# Patient Record
Sex: MALE | Race: WHITE | NOT HISPANIC OR LATINO | Employment: FULL TIME | URBAN - METROPOLITAN AREA
[De-identification: names, ages, dates, MRNs, and addresses within clinical notes are randomized per-mention and may not be internally consistent; named-entity substitution may affect disease eponyms.]

---

## 2021-07-07 ENCOUNTER — HOSPITAL ENCOUNTER (OUTPATIENT)
Facility: MEDICAL CENTER | Age: 36
End: 2021-07-07
Payer: COMMERCIAL

## 2021-07-07 LAB — COVID ORDER STATUS COVID19: NORMAL

## 2021-07-08 LAB
SARS-COV-2 RNA RESP QL NAA+PROBE: NOTDETECTED
SPECIMEN SOURCE: NORMAL

## 2023-09-18 ENCOUNTER — APPOINTMENT (OUTPATIENT)
Dept: RADIOLOGY | Facility: MEDICAL CENTER | Age: 38
DRG: 099 | End: 2023-09-18
Attending: EMERGENCY MEDICINE

## 2023-09-18 ENCOUNTER — OFFICE VISIT (OUTPATIENT)
Dept: URGENT CARE | Facility: CLINIC | Age: 38
End: 2023-09-18

## 2023-09-18 ENCOUNTER — HOSPITAL ENCOUNTER (INPATIENT)
Facility: MEDICAL CENTER | Age: 38
LOS: 7 days | DRG: 099 | End: 2023-09-25
Attending: EMERGENCY MEDICINE | Admitting: INTERNAL MEDICINE

## 2023-09-18 ENCOUNTER — HOSPITAL ENCOUNTER (EMERGENCY)
Facility: MEDICAL CENTER | Age: 38
End: 2023-09-18

## 2023-09-18 VITALS
SYSTOLIC BLOOD PRESSURE: 102 MMHG | DIASTOLIC BLOOD PRESSURE: 62 MMHG | TEMPERATURE: 98.7 F | WEIGHT: 132.5 LBS | BODY MASS INDEX: 22.08 KG/M2 | RESPIRATION RATE: 14 BRPM | HEART RATE: 85 BPM | HEIGHT: 65 IN | OXYGEN SATURATION: 98 %

## 2023-09-18 DIAGNOSIS — R10.84 GENERALIZED ABDOMINAL PAIN: ICD-10-CM

## 2023-09-18 DIAGNOSIS — R20.2 PARESTHESIA: ICD-10-CM

## 2023-09-18 DIAGNOSIS — R29.898 WEAKNESS OF BOTH LOWER EXTREMITIES: ICD-10-CM

## 2023-09-18 DIAGNOSIS — R50.9 FEVER, UNSPECIFIED FEVER CAUSE: ICD-10-CM

## 2023-09-18 DIAGNOSIS — R27.0 ATAXIA: ICD-10-CM

## 2023-09-18 DIAGNOSIS — G37.3 TRANSVERSE MYELITIS (HCC): ICD-10-CM

## 2023-09-18 DIAGNOSIS — M54.2 NECK PAIN: ICD-10-CM

## 2023-09-18 DIAGNOSIS — R26.81 UNSTEADY GAIT WHEN WALKING: ICD-10-CM

## 2023-09-18 DIAGNOSIS — R20.2 NUMBNESS AND TINGLING OF BOTH LOWER EXTREMITIES: ICD-10-CM

## 2023-09-18 DIAGNOSIS — R20.0 NUMBNESS AND TINGLING OF BOTH LOWER EXTREMITIES: ICD-10-CM

## 2023-09-18 LAB
ALBUMIN SERPL BCP-MCNC: 4.3 G/DL (ref 3.2–4.9)
ALBUMIN/GLOB SERPL: 1.3 G/DL
ALP SERPL-CCNC: 85 U/L (ref 30–99)
ALT SERPL-CCNC: 22 U/L (ref 2–50)
ANION GAP SERPL CALC-SCNC: 11 MMOL/L (ref 7–16)
APPEARANCE UR: CLEAR
AST SERPL-CCNC: 19 U/L (ref 12–45)
BASOPHILS # BLD AUTO: 0.5 % (ref 0–1.8)
BASOPHILS # BLD: 0.04 K/UL (ref 0–0.12)
BILIRUB SERPL-MCNC: 0.8 MG/DL (ref 0.1–1.5)
BILIRUB UR QL STRIP.AUTO: NEGATIVE
BUN SERPL-MCNC: 9 MG/DL (ref 8–22)
CALCIUM ALBUM COR SERPL-MCNC: 9.3 MG/DL (ref 8.5–10.5)
CALCIUM SERPL-MCNC: 9.5 MG/DL (ref 8.5–10.5)
CHLORIDE SERPL-SCNC: 100 MMOL/L (ref 96–112)
CO2 SERPL-SCNC: 25 MMOL/L (ref 20–33)
COLOR UR: YELLOW
CREAT SERPL-MCNC: 1.02 MG/DL (ref 0.5–1.4)
CRP SERPL HS-MCNC: <0.3 MG/DL (ref 0–0.75)
EOSINOPHIL # BLD AUTO: 0.03 K/UL (ref 0–0.51)
EOSINOPHIL NFR BLD: 0.3 % (ref 0–6.9)
ERYTHROCYTE [DISTWIDTH] IN BLOOD BY AUTOMATED COUNT: 35.4 FL (ref 35.9–50)
ERYTHROCYTE [SEDIMENTATION RATE] IN BLOOD BY WESTERGREN METHOD: 2 MM/HOUR (ref 0–20)
FLUAV RNA SPEC QL NAA+PROBE: NEGATIVE
FLUBV RNA SPEC QL NAA+PROBE: NEGATIVE
GFR SERPLBLD CREATININE-BSD FMLA CKD-EPI: 96 ML/MIN/1.73 M 2
GLOBULIN SER CALC-MCNC: 3.4 G/DL (ref 1.9–3.5)
GLUCOSE SERPL-MCNC: 101 MG/DL (ref 65–99)
GLUCOSE UR STRIP.AUTO-MCNC: NEGATIVE MG/DL
HCT VFR BLD AUTO: 45.5 % (ref 42–52)
HGB BLD-MCNC: 15.5 G/DL (ref 14–18)
HIV 1+2 AB+HIV1 P24 AG SERPL QL IA: NORMAL
IMM GRANULOCYTES # BLD AUTO: 0.03 K/UL (ref 0–0.11)
IMM GRANULOCYTES NFR BLD AUTO: 0.3 % (ref 0–0.9)
KETONES UR STRIP.AUTO-MCNC: ABNORMAL MG/DL
LEUKOCYTE ESTERASE UR QL STRIP.AUTO: NEGATIVE
LIPASE SERPL-CCNC: 20 U/L (ref 11–82)
LYMPHOCYTES # BLD AUTO: 2.21 K/UL (ref 1–4.8)
LYMPHOCYTES NFR BLD: 25.3 % (ref 22–41)
MCH RBC QN AUTO: 28.9 PG (ref 27–33)
MCHC RBC AUTO-ENTMCNC: 34.1 G/DL (ref 32.3–36.5)
MCV RBC AUTO: 84.9 FL (ref 81.4–97.8)
MICRO URNS: ABNORMAL
MONOCYTES # BLD AUTO: 0.83 K/UL (ref 0–0.85)
MONOCYTES NFR BLD AUTO: 9.5 % (ref 0–13.4)
NEUTROPHILS # BLD AUTO: 5.58 K/UL (ref 1.82–7.42)
NEUTROPHILS NFR BLD: 64.1 % (ref 44–72)
NITRITE UR QL STRIP.AUTO: NEGATIVE
NRBC # BLD AUTO: 0 K/UL
NRBC BLD-RTO: 0 /100 WBC (ref 0–0.2)
PH UR STRIP.AUTO: 6.5 [PH] (ref 5–8)
PLATELET # BLD AUTO: 303 K/UL (ref 164–446)
PMV BLD AUTO: 8.3 FL (ref 9–12.9)
POTASSIUM SERPL-SCNC: 4.6 MMOL/L (ref 3.6–5.5)
PROCALCITONIN SERPL-MCNC: <0.05 NG/ML
PROT SERPL-MCNC: 7.7 G/DL (ref 6–8.2)
PROT UR QL STRIP: NEGATIVE MG/DL
RBC # BLD AUTO: 5.36 M/UL (ref 4.7–6.1)
RBC UR QL AUTO: NEGATIVE
RSV RNA SPEC QL NAA+PROBE: NEGATIVE
SARS-COV-2 RNA RESP QL NAA+PROBE: NOTDETECTED
SODIUM SERPL-SCNC: 136 MMOL/L (ref 135–145)
SP GR UR STRIP.AUTO: 1.01
SPECIMEN SOURCE: NORMAL
T PALLIDUM AB SER QL IA: NORMAL
TSH SERPL DL<=0.005 MIU/L-ACNC: 0.98 UIU/ML (ref 0.38–5.33)
UROBILINOGEN UR STRIP.AUTO-MCNC: 0.2 MG/DL
VIT B12 SERPL-MCNC: 860 PG/ML (ref 211–911)
WBC # BLD AUTO: 8.7 K/UL (ref 4.8–10.8)

## 2023-09-18 PROCEDURE — A9270 NON-COVERED ITEM OR SERVICE: HCPCS | Performed by: INTERNAL MEDICINE

## 2023-09-18 PROCEDURE — 3078F DIAST BP <80 MM HG: CPT | Performed by: STUDENT IN AN ORGANIZED HEALTH CARE EDUCATION/TRAINING PROGRAM

## 2023-09-18 PROCEDURE — 700102 HCHG RX REV CODE 250 W/ 637 OVERRIDE(OP): Performed by: INTERNAL MEDICINE

## 2023-09-18 PROCEDURE — 72157 MRI CHEST SPINE W/O & W/DYE: CPT

## 2023-09-18 PROCEDURE — 82607 VITAMIN B-12: CPT

## 2023-09-18 PROCEDURE — 85025 COMPLETE CBC W/AUTO DIFF WBC: CPT

## 2023-09-18 PROCEDURE — 84145 PROCALCITONIN (PCT): CPT

## 2023-09-18 PROCEDURE — 3074F SYST BP LT 130 MM HG: CPT | Performed by: STUDENT IN AN ORGANIZED HEALTH CARE EDUCATION/TRAINING PROGRAM

## 2023-09-18 PROCEDURE — 83690 ASSAY OF LIPASE: CPT

## 2023-09-18 PROCEDURE — 99254 IP/OBS CNSLTJ NEW/EST MOD 60: CPT | Mod: GC | Performed by: PSYCHIATRY & NEUROLOGY

## 2023-09-18 PROCEDURE — 700117 HCHG RX CONTRAST REV CODE 255: Performed by: EMERGENCY MEDICINE

## 2023-09-18 PROCEDURE — 81003 URINALYSIS AUTO W/O SCOPE: CPT

## 2023-09-18 PROCEDURE — 0241U HCHG SARS-COV-2 COVID-19 NFCT DS RESP RNA 4 TRGT MIC: CPT

## 2023-09-18 PROCEDURE — 99285 EMERGENCY DEPT VISIT HI MDM: CPT

## 2023-09-18 PROCEDURE — 99205 OFFICE O/P NEW HI 60 MIN: CPT | Performed by: STUDENT IN AN ORGANIZED HEALTH CARE EDUCATION/TRAINING PROGRAM

## 2023-09-18 PROCEDURE — 36415 COLL VENOUS BLD VENIPUNCTURE: CPT

## 2023-09-18 PROCEDURE — 86780 TREPONEMA PALLIDUM: CPT

## 2023-09-18 PROCEDURE — A9579 GAD-BASE MR CONTRAST NOS,1ML: HCPCS | Performed by: EMERGENCY MEDICINE

## 2023-09-18 PROCEDURE — 86140 C-REACTIVE PROTEIN: CPT

## 2023-09-18 PROCEDURE — 74177 CT ABD & PELVIS W/CONTRAST: CPT

## 2023-09-18 PROCEDURE — 99223 1ST HOSP IP/OBS HIGH 75: CPT | Performed by: INTERNAL MEDICINE

## 2023-09-18 PROCEDURE — 85652 RBC SED RATE AUTOMATED: CPT

## 2023-09-18 PROCEDURE — C9803 HOPD COVID-19 SPEC COLLECT: HCPCS | Performed by: EMERGENCY MEDICINE

## 2023-09-18 PROCEDURE — 84443 ASSAY THYROID STIM HORMONE: CPT

## 2023-09-18 PROCEDURE — 770020 HCHG ROOM/CARE - TELE (206)

## 2023-09-18 PROCEDURE — 72158 MRI LUMBAR SPINE W/O & W/DYE: CPT

## 2023-09-18 PROCEDURE — 87389 HIV-1 AG W/HIV-1&-2 AB AG IA: CPT

## 2023-09-18 PROCEDURE — 80053 COMPREHEN METABOLIC PANEL: CPT

## 2023-09-18 RX ORDER — ENOXAPARIN SODIUM 100 MG/ML
40 INJECTION SUBCUTANEOUS DAILY
Status: DISCONTINUED | OUTPATIENT
Start: 2023-09-18 | End: 2023-09-25 | Stop reason: HOSPADM

## 2023-09-18 RX ORDER — AMOXICILLIN 250 MG
2 CAPSULE ORAL 2 TIMES DAILY
Status: DISCONTINUED | OUTPATIENT
Start: 2023-09-18 | End: 2023-09-25 | Stop reason: HOSPADM

## 2023-09-18 RX ORDER — BISACODYL 10 MG
10 SUPPOSITORY, RECTAL RECTAL
Status: DISCONTINUED | OUTPATIENT
Start: 2023-09-18 | End: 2023-09-25 | Stop reason: HOSPADM

## 2023-09-18 RX ORDER — ACETAMINOPHEN 325 MG/1
650 TABLET ORAL EVERY 6 HOURS PRN
Status: DISCONTINUED | OUTPATIENT
Start: 2023-09-18 | End: 2023-09-25 | Stop reason: HOSPADM

## 2023-09-18 RX ORDER — POLYETHYLENE GLYCOL 3350 17 G/17G
1 POWDER, FOR SOLUTION ORAL
Status: DISCONTINUED | OUTPATIENT
Start: 2023-09-18 | End: 2023-09-25 | Stop reason: HOSPADM

## 2023-09-18 RX ADMIN — SENNOSIDES AND DOCUSATE SODIUM 2 TABLET: 50; 8.6 TABLET ORAL at 20:25

## 2023-09-18 RX ADMIN — GADOTERIDOL 15 ML: 279.3 INJECTION, SOLUTION INTRAVENOUS at 14:42

## 2023-09-18 RX ADMIN — IOHEXOL 100 ML: 350 INJECTION, SOLUTION INTRAVENOUS at 12:25

## 2023-09-18 ASSESSMENT — ENCOUNTER SYMPTOMS
TINGLING: 1
WEIGHT LOSS: 0
FOCAL WEAKNESS: 1
ORTHOPNEA: 0
SENSORY CHANGE: 1
ABDOMINAL PAIN: 0
HEADACHES: 0
FEVER: 1
WHEEZING: 0
NECK PAIN: 0
FALLS: 0
PALPITATIONS: 0
LOSS OF CONSCIOUSNESS: 0
BACK PAIN: 0
DIZZINESS: 0
BLURRED VISION: 0
HEARTBURN: 0
DOUBLE VISION: 0
CARDIOVASCULAR NEGATIVE: 1
SEIZURES: 0
PALPITATIONS: 0
SHORTNESS OF BREATH: 0
HEMOPTYSIS: 0
PHOTOPHOBIA: 0
CLAUDICATION: 0
NAUSEA: 0
GASTROINTESTINAL NEGATIVE: 1
COUGH: 0
MYALGIAS: 0
TREMORS: 0
BACK PAIN: 1
SPUTUM PRODUCTION: 0
FEVER: 0
WEAKNESS: 1
NECK PAIN: 1
VOMITING: 0
HEADACHES: 1
EYES NEGATIVE: 1
NECK PAIN: 1
FEVER: 1
DIARRHEA: 0
CHILLS: 0
RESPIRATORY NEGATIVE: 1
FOCAL WEAKNESS: 1
DIZZINESS: 1
SENSORY CHANGE: 1
TINGLING: 1
CONSTIPATION: 0
WEAKNESS: 1
SPEECH CHANGE: 0

## 2023-09-18 ASSESSMENT — FIBROSIS 4 INDEX: FIB4 SCORE: 0.51

## 2023-09-18 ASSESSMENT — PAIN DESCRIPTION - PAIN TYPE: TYPE: ACUTE PAIN

## 2023-09-18 ASSESSMENT — VISUAL ACUITY: VA_NORMAL: 1

## 2023-09-18 ASSESSMENT — LIFESTYLE VARIABLES: SUBSTANCE_ABUSE: 1

## 2023-09-18 NOTE — ED PROVIDER NOTES
"ER Provider Note    Scribed for Rohit Pereyra M.D. by Lowell Lee. 9/18/2023   11:09 AM    Primary Care Provider: No primary care provider on file.    CHIEF COMPLAINT  Chief Complaint   Patient presents with    Weakness     +fever, headache, reproducible abdominal pain, neck pain, numbness tingling BLE, weakness since Wednesday. Reports history of myelitis, 7 years ago. He feels like these symptoms are the same.      EXTERNAL RECORDS REVIEWED  Other none    HPI/ROS  LIMITATION TO HISTORY   Select: Language Faroese,  Used   OUTSIDE HISTORIAN(S):  Significant other at bedside.    Alexandrea Isabel is a 38 y.o. male who presents to the ED for evaluation of lower extremity weakness onset 3 ago. The patient states he also has fever (7 days ago), but denies any recent fever, runny nose, chest pain, or difficulty breathing. He describes his legs to have pins and needles and states sever abdomen pain. No alleviating or exacerbating factors were noted. He reports history of myelitis 7 years, treated with steroids and IV fluids over a month.    PAST MEDICAL HISTORY  Past Medical History:   Diagnosis Date    Myelitis (HCC)        SURGICAL HISTORY  History reviewed. No pertinent surgical history.    FAMILY HISTORY  History reviewed. No pertinent family history.    SOCIAL HISTORY   reports that he has been smoking cigarettes. He has never used smokeless tobacco. He reports that he does not currently use alcohol. He reports that he does not currently use drugs.    CURRENT MEDICATIONS  Previous Medications    No medications on file       ALLERGIES  No Known Allergies     PHYSICAL EXAM  /83   Pulse 63   Temp 36.2 °C (97.1 °F) (Temporal)   Resp 20   Ht 1.65 m (5' 4.96\")   Wt 60 kg (132 lb 4.4 oz)   SpO2 94%   BMI 22.04 kg/m²      Constitutional: Well developed, Well nourished, mild distress,    HENT: Normocephalic, Atraumatic   Eyes: PERRLA, EOMI, Conjunctiva normal, No discharge.   Neck: No tenderness, Supple, " No stridor.   Cardiovascular: Normal heart rate, Normal rhythm.   Thorax & Lungs: Clear to auscultation bilaterally, No respiratory distress, No wheezing, No crackles.   Abdomen: Soft, No tenderness, No masses, No pulsatile masses.   Skin: Warm, Dry, No erythema, No rash.    Musculoskeletal: No major deformities noted.  Intact distal pulses  Neurologic: Awake, alert. Moves all extremities spontaneously.   Psychiatric: Affect normal, Judgment normal, Mood normal. Subjective decreased sensation throughout lower extremities, but muscle strength 5/5.       DIAGNOSTIC STUDIES    Labs:   Results for orders placed or performed during the hospital encounter of 09/18/23   CBC WITH DIFFERENTIAL   Result Value Ref Range    WBC 8.7 4.8 - 10.8 K/uL    RBC 5.36 4.70 - 6.10 M/uL    Hemoglobin 15.5 14.0 - 18.0 g/dL    Hematocrit 45.5 42.0 - 52.0 %    MCV 84.9 81.4 - 97.8 fL    MCH 28.9 27.0 - 33.0 pg    MCHC 34.1 32.3 - 36.5 g/dL    RDW 35.4 (L) 35.9 - 50.0 fL    Platelet Count 303 164 - 446 K/uL    MPV 8.3 (L) 9.0 - 12.9 fL    Neutrophils-Polys 64.10 44.00 - 72.00 %    Lymphocytes 25.30 22.00 - 41.00 %    Monocytes 9.50 0.00 - 13.40 %    Eosinophils 0.30 0.00 - 6.90 %    Basophils 0.50 0.00 - 1.80 %    Immature Granulocytes 0.30 0.00 - 0.90 %    Nucleated RBC 0.00 0.00 - 0.20 /100 WBC    Neutrophils (Absolute) 5.58 1.82 - 7.42 K/uL    Lymphs (Absolute) 2.21 1.00 - 4.80 K/uL    Monos (Absolute) 0.83 0.00 - 0.85 K/uL    Eos (Absolute) 0.03 0.00 - 0.51 K/uL    Baso (Absolute) 0.04 0.00 - 0.12 K/uL    Immature Granulocytes (abs) 0.03 0.00 - 0.11 K/uL    NRBC (Absolute) 0.00 K/uL   COMP METABOLIC PANEL   Result Value Ref Range    Sodium 136 135 - 145 mmol/L    Potassium 4.6 3.6 - 5.5 mmol/L    Chloride 100 96 - 112 mmol/L    Co2 25 20 - 33 mmol/L    Anion Gap 11.0 7.0 - 16.0    Glucose 101 (H) 65 - 99 mg/dL    Bun 9 8 - 22 mg/dL    Creatinine 1.02 0.50 - 1.40 mg/dL    Calcium 9.5 8.5 - 10.5 mg/dL    Correct Calcium 9.3 8.5 - 10.5 mg/dL     AST(SGOT) 19 12 - 45 U/L    ALT(SGPT) 22 2 - 50 U/L    Alkaline Phosphatase 85 30 - 99 U/L    Total Bilirubin 0.8 0.1 - 1.5 mg/dL    Albumin 4.3 3.2 - 4.9 g/dL    Total Protein 7.7 6.0 - 8.2 g/dL    Globulin 3.4 1.9 - 3.5 g/dL    A-G Ratio 1.3 g/dL   LIPASE   Result Value Ref Range    Lipase 20 11 - 82 U/L   URINALYSIS    Specimen: Urine   Result Value Ref Range    Color Yellow     Character Clear     Specific Gravity 1.015 <1.035    Ph 6.5 5.0 - 8.0    Glucose Negative Negative mg/dL    Ketones Trace (A) Negative mg/dL    Protein Negative Negative mg/dL    Bilirubin Negative Negative    Urobilinogen, Urine 0.2 Negative    Nitrite Negative Negative    Leukocyte Esterase Negative Negative    Occult Blood Negative Negative    Micro Urine Req see below    ESTIMATED GFR   Result Value Ref Range    GFR (CKD-EPI) 96 >60 mL/min/1.73 m 2   CoV-2, Flu A/B, And RSV by PCR ([facility])    Specimen: Respirate   Result Value Ref Range    Influenza virus A RNA Negative Negative    Influenza virus B, PCR Negative Negative    RSV, PCR Negative Negative    SARS-CoV-2 by PCR NotDetected     SARS-CoV-2 Source NP Swab    Sed Rate   Result Value Ref Range    Sed Rate Westergren 2 0 - 20 mm/hour   CRP QUANTITIVE (NON-CARDIAC)   Result Value Ref Range    Stat C-Reactive Protein <0.30 0.00 - 0.75 mg/dL   PROCALCITONIN   Result Value Ref Range    Procalcitonin <0.05 <0.25 ng/mL     Radiology:   This attending emergency physician has independently interpreted the diagnostic imaging associated with this visit and is awaiting the final reading from the radiologist.   Preliminary interpretation is a follows: No intra-abdominal abnormality  Radiologist interpretation:   MR-THORACIC SPINE-WITH & W/O   Final Result         MRI of the thoracic spine without and with contrast within normal limits.      MR-LUMBAR SPINE-WITH & W/O   Final Result         Mild lumbar spine degenerative changes as described above without significant canal stenosis or  foraminal narrowing.      No abnormal enhancement is identified in the lumbar spine.      Annular fissure noted at the dorsal aspect of L3-4. This could represent an independent source of back pain.      CT-ABDOMEN-PELVIS WITH   Final Result      No acute abnormality of the abdomen or pelvis.             COURSE & MEDICAL DECISION MAKING     ED Observation Status? Yes; I am placing the patient in to an observation status due to a diagnostic uncertainty as well as therapeutic intensity. Patient placed in observation status at 11:20 AM, 9/18/2023.     Observation plan is as follows: serum studies and imaging    Upon Reevaluation, the patient's condition has: not improved; and will be escalated to hospitalization.    Patient discharged from ED Observation status at 3:33 PM (Time) 9/18/2023  (Date).     INITIAL ASSESSMENT, COURSE AND PLAN  Care Narrative: Patient is coming in with a fever approximately a week ago with slowly progressing paresthesias.  Patient is now having right lower extremity weakness.  States he has had myelitis previously approximately 7 years ago where the patient was admitted to the hospital.  Due to the patient's symptoms I have elected to get MRIs of the spine both thoracic and lumbar, these are negative, CRP is negative, procalcitonin is negative, electrolytes are unremarkable however when I attempt to ambulate the patient he is ataxic.  Due to that I will consult neurology admit the patient to the hospital.    11:09 AM - Patient was evaluated at bedside. Ordered for UA, lipase, CMP, cbc w/ diff, procalcitonin, CoV-2, Flu a/b, RSV, crp quant, sed rate, ct-abdomen-pelvis, mr-thoracic spine and lumbar spine to evaluate. Patient verbalizes understanding and support with my plan of care.     Differential diagnoses include but not limited to: transverse myelitis vs sepsis vs Covid     3:33 PM  MRIs have returned, they are unremarkable, discussed this with the patient and the family.  Attempted to  ambulate the patient and he is very ataxic and almost falls down.  Will consult neurology, will have the patient be admitted to the hospital.    DISPOSITION AND DISCUSSIONS    I have discussed management of the patient with the following physicians and ALLIE's: Neurology, hospitalist    Discussion of management with other [facility] or appropriate source(s): Radiologist Dr. Sood      Admit to the hospitalist in guarded condition    FINAL DIAGNOSIS  1. Ataxia    2. Paresthesia         Lowell COLE (Anna), am scribing for, and in the presence of, Rohit Pereyra M.D..    Electronically signed by: Lowell Lee ([email or web address]), 9/18/2023    IRohit M.D. personally performed the services described in this documentation, as scribed by Lowell Lee in my presence, and it is both accurate and complete.      The note accurately reflects work and decisions made by me.  Rohit Pereyra M.D.  9/18/2023  3:36 PM

## 2023-09-18 NOTE — ED TRIAGE NOTES
".  Chief Complaint   Patient presents with    Weakness     +fever, headache, reproducible abdominal pain, neck pain, numbness tingling BLE, weakness since Wednesday. Reports history of myelitis, 7 years ago. He feels like these symptoms are the same.        39 yo male ambulatory to triage for above complaint. He lives in [facility] [facility] and is here for work. Abdominal pain protocol ordered.      Pt is alert and oriented, follows commands and responds appropriately to questions.      Patient placed back for phlebotomy and educated on triage process. Asked to inform RN of any changes or concerns.     /85   Pulse 87   Temp 36.2 °C (97.1 °F) (Temporal)   Resp 18   Ht 1.65 m (5' 4.96\")   Wt 60 kg (132 lb 4.4 oz)   SpO2 97%   BMI 22.04 kg/m²     "

## 2023-09-18 NOTE — ED NOTES
Patient from [facility] to [facility] [facility] via wheelchair accompanied by ED RN and friend. Noted with marked BLE weakness upon ambulation attempt - knees buckling. Chart up for ERP.

## 2023-09-18 NOTE — CONSULTS
"Neurology Daily Progress Note    Date of Service  9/18/2023    Chief Complaint  38 y.o. male admitted 9/18/2023 with subjective fevers followed by paresthesias and weakness in the B/L UEs and LEs.     He is accompanied by his co-worker from [employer] who was kind enough to translate for us with patient's permission.     HPI:    Mr. Isabel is a 37yo male patient w/ pt reported history of \"myelitis\" likely transverse myelitis 7-8 years ago, who presented to the ED on 9/18/2023 with the above complaints.    He reportedly flew in from [place] recently, [place] on 9[place]8/2023, went to work on 9/11 which is when he started having some subjective fevers but no other respiratory/GI/ symptoms of infection. Fever resolved with some OTC meds. However, he then reports developing some weakness and paresthesias in the b/l lower extremities. These symptoms over the next two days started to ascend, to the waist. The sxs also worsened in nature, had numbness and tingling, increased sensitivity to touch, balance issues due to which he started having a wide based ataxic gait. He reports noticing these symptoms mainly in the lower extremities, worse on the left side.     He also reports some pain at the nape of the neck, started yesterday (09/17/2023). Pain is worse on movement, including on flexion of neck. He denies any neck stiffness but does report some limitation of ROM due to pain.     He also reports having a band like tightness around his trunk over the past few days, describes it as a \"Sarong Wrap\" around the upper abdomen.      He was reportedly diagnosed with Transverse myelitis (not sure but remembers someone mentioning \"myelitis\" and that it wasn't \"Guillain [place]\") about 7-8 years ago, had near identical presentation at that time, and was treated with IV medications (unclear if steroids vs IVIG) following which his symptoms had resolved.     He denies having any significant neurological history in his family.     No " loss of bowel or bladder incontinence. No saddle anesthesia.   No recent falls or trauma to the back/head. No new medications or vaccinations recently.   No known sick contacts recently, no difficulty with breathing/swallowing, no recent changes in vision or hearing.     Hospital course:    -MRI lumbar spine w/ and w/o contrast:   Mild lumbar degenerative changes w/o significant canal stenosis or foraminal narrowing. No abnormal enhancement is noted in the lumbar spine.   Annual fissure noted at the dorsal aspect of L3-4, which could represent an independent source of back pain.   (Radiology note also mentions: L3-L4 broad based disc bulge with right paracentral disc extrusion associated annular fissure, however there is only minimal canal narrowing, bilateral mild facet degeneration. L4-L5 Broad based disc bulge and bilateral facet degeneration with a superimposed central disc extrusion migrates caudally behind L5 causing mild canal narrowing, but w/o significant cauda equina compression.)    -MRI thoracic spine w/ and w/o contrast: MRI of the thoracic spine without and with contrast within normal limits.    -CT Abdomen and pelvis w/ contrast: No acute abnormality in the abdomen/pelvis.   -Blood work with CBC and CMP were largely unremarkable. Neg COVID, Flu, and RSV tests.     Code Status  Full Code    Review of Systems  Review of Systems   Constitutional:  Positive for fever (6 days ago, subjective fevers). Negative for chills, malaise/fatigue and weight loss.   HENT: Negative.  Negative for congestion, hearing loss and tinnitus.    Eyes: Negative.  Negative for blurred vision and double vision.   Respiratory: Negative.  Negative for cough, sputum production, shortness of breath and wheezing.    Cardiovascular: Negative.    Gastrointestinal: Negative.  Negative for abdominal pain, diarrhea, heartburn, nausea and vomiting.   Genitourinary: Negative.    Musculoskeletal:  Positive for neck pain (Reports neck pain at  the nape of the neck, worse with movement including neck flexion. No stiffness, but ROM limited due to pain). Negative for back pain, falls and joint pain.   Skin:  Negative for itching and rash.   Neurological:  Positive for tingling, sensory change, focal weakness and weakness. Negative for dizziness, tremors, speech change, seizures, loss of consciousness and headaches.   Endo/Heme/Allergies: Negative.    Psychiatric/Behavioral:  Positive for substance abuse (Smokes cigarettes).    All other systems reviewed and are negative.       Physical Exam  Temp:  [36.2 °C (97.1 °F)] 36.2 °C (97.1 °F)  Pulse:  [63-87] 63  Resp:  [13-20] 20  BP: (120-138)/(76-85) 125/83  SpO2:  [94 %-97 %] 94 %    Neurological Exam  Mental Status  Awake and alert. Oriented to person, place, time and situation. Recent and remote memory are intact. Speech is normal. Attention and concentration are normal. Fund of knowledge is appropriate for level of education.    Cranial Nerves  CN II: Visual acuity is normal. Visual fields full to confrontation.  CN III, IV, VI: Extraocular movements intact bilaterally. Normal lids and orbits bilaterally. Pupils equal round and reactive to light bilaterally.  CN V: Facial sensation is normal.  CN VII: Full and symmetric facial movement.  CN VIII: Hearing is normal.  CN IX, X: Palate elevates symmetrically. Normal gag reflex.  CN XI: Shoulder shrug strength is normal.  CN XII: Tongue midline without atrophy or fasciculations.    Motor  Normal muscle bulk throughout. Normal muscle tone. Strength is 5/5 in all four extremities except as noted. No pronator drift.                                             Right                     Left   Shoulder abduction               5                          5   Shoulder adduction               5                          5  Elbow flexion                         5                          5  Elbow extension                    5                          5  Wrist flexion                            5                          5  Wrist extension                      5                          5  Finger flexion                         5                          5  Finger extension                    5                          5  Finger abduction                    5                          5  Thumb flexion                        5                          5  Thumb extension                   5                          5  Thumb abduction                   5                          5  Hip flexion                              5                          4+  Hip extension                         5                          4+  Knee flexion                           5                          5  Knee extension                      5                          5  Plantarflexion                         5                          5  Dorsiflexion                            5                          5                                             Right                     Left   Quadriceps                                                    4  Strength: Strength 4/5 in the LLE thigh flexion and extension, otherwise 5/5 everywhere else. .    Sensory  Light touch is normal in upper and lower extremities. Temperature abnormality: See under sensory level. . Vibration abnormality: Vibration sense reduced at the toes bilaterally. Intact in the b/l upper extremities. .   Right: There is a sensory level at T5.  Left: There is a sensory level at T4. Sensation level: Had notable sensory deficits over the back, from T4-T5 level and below. .    Reflexes                                            Right                      Left  Brachioradialis                    3+                         3+  Biceps                                 3+                         3+  Triceps                                3+                         3+  Finger flex                           3+                         3+  Hamstring                             3+                         3+  Patellar                                3+                         3+  Right Plantar: equivocal  Left Plantar: equivocal  Deep tendon reflexes: Plantar reflex equivocal bilaterally, favorable to downgoing. .    Right pathological reflexes: Echo's present. Ankle clonus present. Sustained.  Left pathological reflexes: Echo's present. Ankle clonus present. Sustained.  Pathological reflexes: Wartenberg's Sign negative bilaterally. .    Coordination  Right: Finger-to-nose normal.Left: Finger-to-nose normal.  Abnormal coordination in the LLE.  On Toe to Finger testing, he had notable ataxia in the left lower extremity. .    Gait  Casual gait: Ataxic gait. Wide-based, cautious, slow gait. . Tandem gait abnormality:      Fluids  No intake or output data in the 24 hours ending 09/18/23 1623    Laboratory  Recent Labs     09/18/23  1015   WBC 8.7   RBC 5.36   HEMOGLOBIN 15.5   HEMATOCRIT 45.5   MCV 84.9   MCH 28.9   MCHC 34.1   RDW 35.4*   PLATELETCT 303   MPV 8.3*     Recent Labs     09/18/23  1015   SODIUM 136   POTASSIUM 4.6   CHLORIDE 100   CO2 25   GLUCOSE 101*   BUN 9   CREATININE 1.02   CALCIUM 9.5                   Imaging  MR-THORACIC SPINE-WITH & W/O   Final Result         MRI of the thoracic spine without and with contrast within normal limits.      MR-LUMBAR SPINE-WITH & W/O   Final Result         Mild lumbar spine degenerative changes as described above without significant canal stenosis or foraminal narrowing.      No abnormal enhancement is identified in the lumbar spine.      Annular fissure noted at the dorsal aspect of L3-4. This could represent an independent source of back pain.      CT-ABDOMEN-PELVIS WITH   Final Result      No acute abnormality of the abdomen or pelvis.           Assessment/Plan     Summary:    Mr. Isabel is a 39yo male patient w/ pt reported personal hx of transverse myelitis over 7-8 years ago, w/o any residual focal neurological  deficits at baseline, who presented to the ED on 9/18/2023 w/ subjective fevers 1 week ago followed by development of sxs like paresthesias, weakness, and ataxic gait. MRI of the thoracic and lumbar spine are negative for any extraaxial compressive etiology. Neurology consulted due to concerns for transverse myelitis.     Assessment:    -Pt presents with acute onset sensorimotor dysfunction that is attributable to the spinal cord, and presence of myelopathic sxs. No bowel/bladder incontinence or evidence of autonomic dysfunction at this time. Clinical presentation is certainly concerning for acute myelopathy, including Transverse Myelitis.   -There is a clearly defined sensory level around T4-T5 on exam and hx of band like tightness in the same region but interestingly his MRI of thoracic spine was neg for attributable lesions. He also has some focal motor weakness in the LLE (hips 4/5, 5/5 elsewhere).   -Given hx of fever and meningismus, it is important to rule an infectious etiology before treating with high dose steroids/IVIG.    Plan/Recommendations:    -Obtain MRI of the Brain and Cervical spine w/ and w/o contrast ASAP.   -Lumbar puncture after MRI of the brain and cervical spine are completed.   -Final recommendations for treatment after these evaluations are completed.       Thank you for the consult.     Dr. Jennifer Rosenbaum MD   [facility] IM PGY 2 Resident    Please refer to Dr. Gonzalez's attestation for additional comments/recommendations.

## 2023-09-18 NOTE — ED NOTES
ED Triage Whitley Iraheta states that urine sample was sent from [facility]. Called lab - state that they do not have a urine sample. Patient informed of need for recollection of urine specimen. Urinal at bedside.

## 2023-09-18 NOTE — ED NOTES
Name:  Ena   ID Number:  [id number]    Content Discussed:      Patient assessment and history, fall precautions, plan of care.

## 2023-09-18 NOTE — ED NOTES
Med rec is complete per Patient's friend at bedside.     Allergies reviewed.    Has patient had any outside antibiotics in the last 30 days? N    Any Anticoagulants (rivaroxaban, apixaban, edoxaban, dabigatran, warfarin, enoxaparin) taken in the last 14 days? N     Chemo or Outpatient Infusions? N       Preferred pharmacy for this visit : [facility] [phone]

## 2023-09-18 NOTE — PROGRESS NOTES
"Subjective     Maame Lechuga is a 38 y.o. male who presents with Fever (Last Monday went away on tuesday), Headache (Started yesterday), Tingling (Started in his abdomen,an down his legs. Now it feels like pins an needles. Very shakey when he walks.  ), and Neck Pain (Hurts when he turns his head/)            Maame is a 38 y.o. male who presents to urgent care with his coworker.  Patient states he developed a fever on Monday of last week.  Fever has since resolved.  Patient then developed symptoms of headache, neck pain and lower back pain.  Patient also experiencing symptoms of numbness and tingling which he describes as \"pins-and-needles\" sensation in bilateral lower legs and abdomen. Patient also has had unsteady gait and his coworker reports unsteady gait/weakness which he has need assistance to walk from co-workers. Patient does report prior history of myelitis and states symptoms are similar.  Patient also states he has not been eating/drinking as much this week secondary to not feeling well and weakness. No URI-like symptoms.        Review of Systems   Constitutional:  Positive for fever and malaise/fatigue.   Cardiovascular:  Negative for chest pain and palpitations.   Musculoskeletal:  Positive for back pain and neck pain.   Neurological:  Positive for tingling, sensory change, focal weakness, weakness and headaches.              Objective     /62   Pulse 85   Temp 37.1 °C (98.7 °F) (Temporal)   Resp 14   Ht 1.65 m (5' 4.96\")   Wt 60.1 kg (132 lb 7.9 oz)   SpO2 98%   BMI 22.08 kg/m²      Physical Exam  Vitals reviewed.   Constitutional:       General: He is not in acute distress.     Appearance: He is not toxic-appearing.   HENT:      Head: Normocephalic and atraumatic.      Mouth/Throat:      Mouth: Mucous membranes are moist.      Pharynx: Oropharynx is clear.   Eyes:      Extraocular Movements: Extraocular movements intact.      Conjunctiva/sclera: Conjunctivae normal.      Pupils: Pupils are equal, " round, and reactive to light.   Cardiovascular:      Rate and Rhythm: Normal rate and regular rhythm.   Pulmonary:      Effort: Pulmonary effort is normal.      Breath sounds: Normal breath sounds.   Abdominal:      General: Abdomen is flat. Bowel sounds are increased.      Tenderness: There is abdominal tenderness (generaliozed abdominal tenderness in all 4 quadrants). There is no right CVA tenderness or left CVA tenderness.   Neurological:      General: No focal deficit present.      Mental Status: He is alert and oriented to person, place, and time.      GCS: GCS eye subscore is 4. GCS verbal subscore is 5. GCS motor subscore is 6.      Cranial Nerves: Cranial nerves 2-12 are intact.      Coordination: Coordination abnormal.      Gait: Gait abnormal.                             Assessment & Plan        1. Fever, unspecified fever cause    2. Numbness and tingling of both lower extremities    3. Unsteady gait when walking    4. Neck pain    5. Generalized abdominal pain     DDX includes but not limited to peripheral neuropathy, lumbar radiculopathy, spinal cord compression, Guillain-Denver Syndrome, myelitis, multiple sclerosis.    Recommended ER transfer for further evaluation/management. Patient agreeable. Patients co-worker will drive him. Transfer center contacted and notified of patients arrival.

## 2023-09-19 ENCOUNTER — APPOINTMENT (OUTPATIENT)
Dept: RADIOLOGY | Facility: MEDICAL CENTER | Age: 38
DRG: 099 | End: 2023-09-19
Attending: INTERNAL MEDICINE

## 2023-09-19 LAB
ANION GAP SERPL CALC-SCNC: 10 MMOL/L (ref 7–16)
BASOPHILS # BLD AUTO: 0.5 % (ref 0–1.8)
BASOPHILS # BLD: 0.04 K/UL (ref 0–0.12)
BUN SERPL-MCNC: 18 MG/DL (ref 8–22)
BURR CELLS/RBC NFR CSF MANUAL: 0 %
BURR CELLS/RBC NFR CSF MANUAL: 0 %
C GATTII+NEOFOR DNA CSF QL NAA+NON-PROBE: NOT DETECTED
CALCIUM SERPL-MCNC: 9 MG/DL (ref 8.5–10.5)
CHLORIDE SERPL-SCNC: 100 MMOL/L (ref 96–112)
CLARITY CSF: CLEAR
CLARITY CSF: CLEAR
CMV DNA CSF QL NAA+NON-PROBE: NOT DETECTED
CO2 SERPL-SCNC: 25 MMOL/L (ref 20–33)
COLOR CSF: COLORLESS
COLOR CSF: COLORLESS
COLOR SPUN CSF: COLORLESS
COLOR SPUN CSF: COLORLESS
CREAT SERPL-MCNC: 0.96 MG/DL (ref 0.5–1.4)
CSF COMMENTS 1658: ABNORMAL
E COLI K1 DNA CSF QL NAA+NON-PROBE: NOT DETECTED
EOSINOPHIL # BLD AUTO: 0.15 K/UL (ref 0–0.51)
EOSINOPHIL NFR BLD: 1.9 % (ref 0–6.9)
ERYTHROCYTE [DISTWIDTH] IN BLOOD BY AUTOMATED COUNT: 35.3 FL (ref 35.9–50)
EV RNA CSF QL NAA+NON-PROBE: NOT DETECTED
GFR SERPLBLD CREATININE-BSD FMLA CKD-EPI: 103 ML/MIN/1.73 M 2
GLUCOSE CSF-MCNC: 57 MG/DL (ref 40–80)
GLUCOSE SERPL-MCNC: 114 MG/DL (ref 65–99)
GP B STREP DNA CSF QL NAA+NON-PROBE: NOT DETECTED
GRAM STN SPEC: NORMAL
HAEM INFLU DNA CSF QL NAA+NON-PROBE: NOT DETECTED
HCT VFR BLD AUTO: 44.6 % (ref 42–52)
HGB BLD-MCNC: 15.3 G/DL (ref 14–18)
HHV6 DNA CSF QL NAA+NON-PROBE: NOT DETECTED
HSV1 DNA CSF QL NAA+NON-PROBE: NOT DETECTED
HSV2 DNA CSF QL NAA+NON-PROBE: NOT DETECTED
IMM GRANULOCYTES # BLD AUTO: 0.03 K/UL (ref 0–0.11)
IMM GRANULOCYTES NFR BLD AUTO: 0.4 % (ref 0–0.9)
INR PPP: 0.96 (ref 0.87–1.13)
L MONOCYTOG DNA CSF QL NAA+NON-PROBE: NOT DETECTED
LYMPHOCYTES # BLD AUTO: 1.62 K/UL (ref 1–4.8)
LYMPHOCYTES NFR BLD: 21 % (ref 22–41)
LYMPHOCYTES NFR CSF: 81 %
MCH RBC QN AUTO: 28.9 PG (ref 27–33)
MCHC RBC AUTO-ENTMCNC: 34.3 G/DL (ref 32.3–36.5)
MCV RBC AUTO: 84.2 FL (ref 81.4–97.8)
MONOCYTES # BLD AUTO: 0.75 K/UL (ref 0–0.85)
MONOCYTES NFR BLD AUTO: 9.7 % (ref 0–13.4)
MONOS+MACROS NFR CSF MANUAL: 8 %
N MEN DNA CSF QL NAA+NON-PROBE: NOT DETECTED
NEUTROPHILS # BLD AUTO: 5.12 K/UL (ref 1.82–7.42)
NEUTROPHILS NFR BLD: 66.5 % (ref 44–72)
NEUTROPHILS NFR CSF: 11 %
NRBC # BLD AUTO: 0 K/UL
NRBC BLD-RTO: 0 /100 WBC (ref 0–0.2)
NUC CELL # CSF: 46 CELLS/UL (ref 0–10)
NUC CELL # CSF: 95 CELLS/UL (ref 0–10)
PARECHOVIRUS A RNA CSF QL NAA+NON-PROBE: NOT DETECTED
PLATELET # BLD AUTO: 315 K/UL (ref 164–446)
PMV BLD AUTO: 8.2 FL (ref 9–12.9)
POTASSIUM SERPL-SCNC: 4.4 MMOL/L (ref 3.6–5.5)
PROT CSF-MCNC: 62 MG/DL (ref 15–45)
PROTHROMBIN TIME: 12.9 SEC (ref 12–14.6)
RBC # BLD AUTO: 5.3 M/UL (ref 4.7–6.1)
RBC # CSF: <1 CELLS/UL
RBC # CSF: <1 CELLS/UL
S PNEUM DNA CSF QL NAA+NON-PROBE: NOT DETECTED
SIGNIFICANT IND 70042: NORMAL
SITE SITE: NORMAL
SODIUM SERPL-SCNC: 135 MMOL/L (ref 135–145)
SOURCE SOURCE: NORMAL
SPECIMEN VOL CSF: 15 ML
SPECIMEN VOL CSF: 15 ML
TUBE # CSF: 4
TUBE # CSF: 4
TUBE # CSF: ABNORMAL
TUBE # CSF: ABNORMAL
VZV DNA CSF QL NAA+NON-PROBE: NOT DETECTED
WBC # BLD AUTO: 7.7 K/UL (ref 4.8–10.8)

## 2023-09-19 PROCEDURE — 87070 CULTURE OTHR SPECIMN AEROBIC: CPT

## 2023-09-19 PROCEDURE — 87205 SMEAR GRAM STAIN: CPT

## 2023-09-19 PROCEDURE — 87533 HHV-6 DNA QUANT: CPT

## 2023-09-19 PROCEDURE — 89051 BODY FLUID CELL COUNT: CPT

## 2023-09-19 PROCEDURE — 82040 ASSAY OF SERUM ALBUMIN: CPT

## 2023-09-19 PROCEDURE — 83916 OLIGOCLONAL BANDS: CPT

## 2023-09-19 PROCEDURE — 86592 SYPHILIS TEST NON-TREP QUAL: CPT

## 2023-09-19 PROCEDURE — 700102 HCHG RX REV CODE 250 W/ 637 OVERRIDE(OP): Performed by: INTERNAL MEDICINE

## 2023-09-19 PROCEDURE — 80048 BASIC METABOLIC PNL TOTAL CA: CPT

## 2023-09-19 PROCEDURE — 85025 COMPLETE CBC W/AUTO DIFF WBC: CPT

## 2023-09-19 PROCEDURE — 700117 HCHG RX CONTRAST REV CODE 255: Performed by: INTERNAL MEDICINE

## 2023-09-19 PROCEDURE — 84157 ASSAY OF PROTEIN OTHER: CPT

## 2023-09-19 PROCEDURE — 62270 DX LMBR SPI PNXR: CPT | Performed by: HOSPITALIST

## 2023-09-19 PROCEDURE — 87799 DETECT AGENT NOS DNA QUANT: CPT

## 2023-09-19 PROCEDURE — 36415 COLL VENOUS BLD VENIPUNCTURE: CPT

## 2023-09-19 PROCEDURE — 770020 HCHG ROOM/CARE - TELE (206)

## 2023-09-19 PROCEDURE — 86788 WEST NILE VIRUS AB IGM: CPT

## 2023-09-19 PROCEDURE — 86789 WEST NILE VIRUS ANTIBODY: CPT

## 2023-09-19 PROCEDURE — 97162 PT EVAL MOD COMPLEX 30 MIN: CPT

## 2023-09-19 PROCEDURE — A9270 NON-COVERED ITEM OR SERVICE: HCPCS | Performed by: INTERNAL MEDICINE

## 2023-09-19 PROCEDURE — 72156 MRI NECK SPINE W/O & W/DYE: CPT

## 2023-09-19 PROCEDURE — 87483 CNS DNA AMP PROBE TYPE 12-25: CPT

## 2023-09-19 PROCEDURE — 85610 PROTHROMBIN TIME: CPT

## 2023-09-19 PROCEDURE — 009U3ZX DRAINAGE OF SPINAL CANAL, PERCUTANEOUS APPROACH, DIAGNOSTIC: ICD-10-PCS | Performed by: HOSPITALIST

## 2023-09-19 PROCEDURE — 97166 OT EVAL MOD COMPLEX 45 MIN: CPT

## 2023-09-19 PROCEDURE — 82784 ASSAY IGA/IGD/IGG/IGM EACH: CPT | Mod: 91

## 2023-09-19 PROCEDURE — A9579 GAD-BASE MR CONTRAST NOS,1ML: HCPCS | Performed by: INTERNAL MEDICINE

## 2023-09-19 PROCEDURE — 99232 SBSQ HOSP IP/OBS MODERATE 35: CPT | Mod: 25 | Performed by: INTERNAL MEDICINE

## 2023-09-19 PROCEDURE — 99232 SBSQ HOSP IP/OBS MODERATE 35: CPT | Mod: GC | Performed by: PSYCHIATRY & NEUROLOGY

## 2023-09-19 PROCEDURE — 82042 OTHER SOURCE ALBUMIN QUAN EA: CPT

## 2023-09-19 PROCEDURE — 82945 GLUCOSE OTHER FLUID: CPT

## 2023-09-19 PROCEDURE — 70553 MRI BRAIN STEM W/O & W/DYE: CPT

## 2023-09-19 RX ORDER — NICOTINE 21 MG/24HR
14 PATCH, TRANSDERMAL 24 HOURS TRANSDERMAL
Status: DISCONTINUED | OUTPATIENT
Start: 2023-09-19 | End: 2023-09-20

## 2023-09-19 RX ORDER — NICOTINE 21 MG/24HR
14 PATCH, TRANSDERMAL 24 HOURS TRANSDERMAL
Status: DISCONTINUED | OUTPATIENT
Start: 2023-09-19 | End: 2023-09-19

## 2023-09-19 RX ADMIN — SENNOSIDES AND DOCUSATE SODIUM 2 TABLET: 50; 8.6 TABLET ORAL at 17:38

## 2023-09-19 RX ADMIN — GADOTERIDOL 13 ML: 279.3 INJECTION, SOLUTION INTRAVENOUS at 15:07

## 2023-09-19 RX ADMIN — ACETAMINOPHEN 650 MG: 325 TABLET, FILM COATED ORAL at 21:32

## 2023-09-19 ASSESSMENT — COGNITIVE AND FUNCTIONAL STATUS - GENERAL
DAILY ACTIVITIY SCORE: 23
STANDING UP FROM CHAIR USING ARMS: A LITTLE
WALKING IN HOSPITAL ROOM: A LITTLE
SUGGESTED CMS G CODE MODIFIER DAILY ACTIVITY: CI
MOBILITY SCORE: 19
MOVING FROM LYING ON BACK TO SITTING ON SIDE OF FLAT BED: A LITTLE
HELP NEEDED FOR BATHING: A LITTLE
CLIMB 3 TO 5 STEPS WITH RAILING: A LOT
SUGGESTED CMS G CODE MODIFIER MOBILITY: CK

## 2023-09-19 ASSESSMENT — ENCOUNTER SYMPTOMS
NECK PAIN: 1
VOMITING: 0
SEIZURES: 0
RESPIRATORY NEGATIVE: 1
DIARRHEA: 0
FEVER: 0
NAUSEA: 0
WHEEZING: 0
ABDOMINAL PAIN: 0
LOSS OF CONSCIOUSNESS: 0
CHILLS: 0
WEAKNESS: 1
BLURRED VISION: 0
GASTROINTESTINAL NEGATIVE: 1
DOUBLE VISION: 0
SPEECH CHANGE: 0
EYES NEGATIVE: 1
FALLS: 0
FOCAL WEAKNESS: 1
SHORTNESS OF BREATH: 0
SENSORY CHANGE: 1
BACK PAIN: 0
COUGH: 0
WEIGHT LOSS: 0
TINGLING: 1
CARDIOVASCULAR NEGATIVE: 1
HEARTBURN: 0
TREMORS: 0
DIZZINESS: 0
HEADACHES: 0
SPUTUM PRODUCTION: 0

## 2023-09-19 ASSESSMENT — GAIT ASSESSMENTS
ASSISTIVE DEVICE: FRONT WHEEL WALKER
DEVIATION: INCREASED BASE OF SUPPORT;DECREASED HEEL STRIKE
DISTANCE (FEET): 75
GAIT LEVEL OF ASSIST: MINIMAL ASSIST

## 2023-09-19 ASSESSMENT — PAIN DESCRIPTION - PAIN TYPE
TYPE: ACUTE PAIN
TYPE: ACUTE PAIN

## 2023-09-19 ASSESSMENT — VISUAL ACUITY: VA_NORMAL: 1

## 2023-09-19 ASSESSMENT — FIBROSIS 4 INDEX: FIB4 SCORE: 0.51

## 2023-09-19 ASSESSMENT — LIFESTYLE VARIABLES: SUBSTANCE_ABUSE: 1

## 2023-09-19 ASSESSMENT — ACTIVITIES OF DAILY LIVING (ADL): TOILETING: INDEPENDENT

## 2023-09-19 NOTE — H&P
Hospital Medicine History & Physical Note    Date of Service  9/18/2023    Primary Care Physician  Pcp Pt States None    Consultants  neurology    Code Status  Full Code    Chief Complaint  Chief Complaint   Patient presents with    Weakness     +fever, headache, reproducible abdominal pain, neck pain, numbness tingling BLE, weakness since Wednesday. Reports history of myelitis, 7 years ago. He feels like these symptoms are the same.        History of Presenting Illness      38-year-old male with no significant past medical history who presented 9/18 with numbness and weakness on his legs.  Started a week ago with fever and later started having numbness and tingling on his feet, below his knees, with some weakness and abnormal gait, denied any abnormalities or weakness or numbness on his arms and no chest pain or shortness of breath, no fever or chills no urinary or bowel symptoms.  Patient had the same issue in [place] however more than 10 years ago and he was treated at the hospital with.  Steroid according to his girlfriend labs around baseline, vital signs were stable, MRI lumbar and thoracic spine with and without contrast did not show any acute finding, neurology was consulted and planning to get MRI for brain and cervical with and without contrast and LP after.     I discussed the plan of care with patient, family, and ED physician .    Review of Systems  Review of Systems   Constitutional:  Negative for chills, fever and weight loss.   HENT:  Negative for ear pain, hearing loss and tinnitus.    Eyes:  Negative for blurred vision, double vision and photophobia.   Respiratory:  Negative for cough and hemoptysis.    Cardiovascular:  Negative for chest pain, palpitations, orthopnea and claudication.   Gastrointestinal:  Negative for abdominal pain, constipation, diarrhea, nausea and vomiting.   Genitourinary:  Negative for dysuria, frequency and urgency.   Musculoskeletal:  Negative for myalgias and neck pain.    Skin:  Negative for rash.   Neurological:  Positive for dizziness, sensory change, focal weakness and weakness. Negative for speech change.       Past Medical History   has a past medical history of Myelitis (HCC).    Surgical History  Denied any past surgical history    Family History  Patient denied any family history of neuro disease  Family history reviewed with patient. There is no family history that is pertinent to the chief complaint.     Social History   reports that he has been smoking cigarettes. He has never used smokeless tobacco. He reports that he does not currently use alcohol. He reports that he does not currently use drugs.    Allergies  No Known Allergies    Medications  None       Physical Exam  Temp:  [36.2 °C (97.1 °F)] 36.2 °C (97.1 °F)  Pulse:  [63-87] 64  Resp:  [13-20] 20  BP: (120-138)/(76-85) 120/77  SpO2:  [94 %-97 %] 96 %  Blood Pressure: 120/77   Temperature: 36.2 °C (97.1 °F)   Pulse: 64   Respiration: 20   Pulse Oximetry: 96 %       Physical Exam  Constitutional:       Appearance: He is not ill-appearing.   Eyes:      General: No scleral icterus.  Cardiovascular:      Rate and Rhythm: Normal rate.      Heart sounds: No murmur heard.  Pulmonary:      Effort: No respiratory distress.      Breath sounds: No wheezing.   Abdominal:      General: There is no distension.      Tenderness: There is no abdominal tenderness. There is no right CVA tenderness, left CVA tenderness or guarding.   Musculoskeletal:      Right lower leg: No edema.      Left lower leg: No edema.   Lymphadenopathy:      Cervical: No cervical adenopathy.   Skin:     Coloration: Skin is not jaundiced.      Findings: No bruising, lesion or rash.   Neurological:      Mental Status: He is alert and oriented to person, place, and time.      Cranial Nerves: Cranial nerve deficit present.      Sensory: Sensory deficit present.      Motor: Weakness present.      Coordination: Coordination normal.      Gait: Gait abnormal.       "Deep Tendon Reflexes: Reflexes abnormal.      Comments: Patient has no sensation below his knees and weakness  Abnormal walking.  Babinski are negative             Laboratory:  Recent Labs     09/18/23  1015   WBC 8.7   RBC 5.36   HEMOGLOBIN 15.5   HEMATOCRIT 45.5   MCV 84.9   MCH 28.9   MCHC 34.1   RDW 35.4*   PLATELETCT 303   MPV 8.3*     Recent Labs     09/18/23  1015   SODIUM 136   POTASSIUM 4.6   CHLORIDE 100   CO2 25   GLUCOSE 101*   BUN 9   CREATININE 1.02   CALCIUM 9.5     Recent Labs     09/18/23  1015   ALTSGPT 22   ASTSGOT 19   ALKPHOSPHAT 85   TBILIRUBIN 0.8   LIPASE 20   GLUCOSE 101*         No results for input(s): \"NTPROBNP\" in the last 72 hours.      No results for input(s): \"TROPONINT\" in the last 72 hours.    Imaging:  MR-THORACIC SPINE-WITH & W/O   Final Result         MRI of the thoracic spine without and with contrast within normal limits.      MR-LUMBAR SPINE-WITH & W/O   Final Result         Mild lumbar spine degenerative changes as described above without significant canal stenosis or foraminal narrowing.      No abnormal enhancement is identified in the lumbar spine.      Annular fissure noted at the dorsal aspect of L3-4. This could represent an independent source of back pain.      CT-ABDOMEN-PELVIS WITH   Final Result      No acute abnormality of the abdomen or pelvis.      MR-BRAIN-WITH & W/O    (Results Pending)   MR-CERVICAL SPINE-WITH & W/O    (Results Pending)       X-Ray:  I have personally reviewed the images and compared with prior images.  EKG:  I have personally reviewed the images and compared with prior images.    Assessment/Plan:  Justification for Admission Status  I anticipate this patient will require at least two midnights for appropriate medical management, necessitating inpatient admission because lower extremity weakness and possible Guillain-Barré    Patient will need a Telemetry bed on NEUROLOGY service .  The need is secondary to lower extremity weakness and " possible Guillain-Barré.    * Lower extremity weakness  Assessment & Plan  Patient had same problem years ago and treated with steroid possible myelitis  MRI did not show any acute finding and will order MRI for cervical and brain  Patient might need LP after MRI  On exam patient has peripheral nerve disease, possible Guillain-Barré  Neurology on board    Ataxia- (present on admission)  Assessment & Plan  Abnormal gait with lower extremity weakness and numbness  Started a week before admission  MRI lumbar and thoracic are normal  We will order MRI cervical and brain  LP after the MRI  Neurology was consulted        VTE prophylaxis: SCDs/TEDs and enoxaparin ppx

## 2023-09-19 NOTE — ASSESSMENT & PLAN NOTE
"Patient had same problem years ago and treated with steroid possible myelitis  MRI did not show any acute finding and will order MRI for cervical and brain  Patient might need LP after MRI  On exam patient has peripheral nerve disease, possible Guillain-Barré  Neurology on board\"    Reviewed MRI brain. No acute pathology  Reviewed MRI spine. Mild DJD  Spoke with Neurology  LP performed buy Dr. Estrada  Ordered Gram and [facility]  NEurology ordered autoimmune studies  After review, likely transvers myelitis  Day 5 steroids. Improving.  Rehab evaluating most likely outpt PT and OT  "

## 2023-09-19 NOTE — PROGRESS NOTES
4 Eyes Skin Assessment Completed by Chris, RN and AMERICA Martinez.    Head WDL  Ears WDL  Nose WDL  Mouth WDL  Neck WDL  Breast/Chest WDL  Shoulder Blades WDL  Spine WDL  (R) Arm/Elbow/Hand WDL  (L) Arm/Elbow/Hand WDL  Abdomen WDL  Groin WDL  Scrotum/Coccyx/Buttocks WDL  (R) Leg WDL  (L) Leg WDL  (R) Heel/Foot/Toe WDL  (L) Heel/Foot/Toe WDL          Devices In Places Tele Box and Pulse Ox      Interventions In Place Pillows    Possible Skin Injury No    Pictures Uploaded Into Epic N/A  Wound Consult Placed N/A  RN Wound Prevention Protocol Ordered No

## 2023-09-19 NOTE — THERAPY
Physical Therapy   Initial Evaluation     Patient Name: Alexandrea Isabel  Age:  38 y.o., Sex:  male  Medical Record #: [id number]  Today's Date: 9/19/2023     Precautions  Precautions: (P) Fall Risk    Assessment    Patient is  38-year-old male presented 9/18 with numbness and weakness on his legs started one week ago with a fever. .has a past medical history of Myelitis 10 years ago. MRI for brain and cervical pending. MRI lumbar and T spine.    Pt very pleasant and cooperative, benefits from use of . Pt noted to have 5/5 strength BLE. C/o of N & T B LE and balance problems. Pt is a fall risk. Pt with apraxic gair pattern, L > R  knees snapping in hyper ext during gait. B bowed knees, Pt c/o of T/S soreness and wrapping around to abdomin. Observed flat T/Spine R, convex curve lower T/L spine.  Pt states he remembers being Dx with curve in his spine in past. Depending on pt progress recommend post acute vs home with out pt PT. Out pt services may be complicated by pt living situation, as he is in [place] for business and uses a shuttle for work. Will see pt during acute care. See Flow sheet for details.        Plan    Physical Therapy Initial Treatment Plan   Treatment Plan : (P) Gait Training, Stair Training, Self Care / Home Evaluation, Therapeutic Activities  Treatment Frequency: (P) 4 Times per Week  Duration: (P) Until Therapy Goals Met    DC Equipment Recommendations: (P) Unable to determine at this time  Discharge Recommendations: (P) Recommend post-acute placement for additional physical therapy services prior to discharge home (vs home with outpt)          09/19/23 1035   Charge Group   PT Evaluation PT Evaluation Mod   Total Time Spent   PT Total Time Yes   PT Evaluation Time Spent (Mins) 45    Services   Is patient using  services for this encounter? Yes   Language Interpreted Macedonian    Name Howie    Mode iPad   Content of Interpretation (select all)  History/Visit information;Patient Education;Other  (PT eval)   Initial Contact Note    Initial Contact Note Order Received and Verified, Physical Therapy Evaluation in Progress with Full Report to Follow.   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 Pt sits and ambulates needs to climb stairs during the job. Lives alone in hotel room and has coworkers near by. Pt takes shuttle to work.   Prior Level of Functional Mobility   Bed Mobility Independent   Transfer Status Independent   Ambulation Independent   History of Falls   History of Falls No   Cognition    Cognition / Consciousness WDL   Level of Consciousness Alert   Comments pt request    Passive ROM Lower Body   Passive ROM Lower Body X   Comments L > R heelcord tightness   Active ROM Lower Body    Active ROM Lower Body  WDL   Strength Lower Body   Lower Body Strength  WDL   Comments per MMT sitting, noted hyper ext B knees and snapping during amb L>R.   Sensation Lower Body   Lower Extremity Sensation   X   Comments pt c/o of numbness and tingling B feet and legs.   Lower Body Muscle Tone   Lower Body Muscle Tone  WDL   Coordination Lower Body    Coordination Lower Body  X   Comments apraxia   Vision   Vision Comments none   Other Treatments   Other Treatments Provided pt education on amb with fWW, safety while up in room, needs to ask for assist or use urinal, risk for falls   Balance Assessment   Sitting Balance (Static) Good   Sitting Balance (Dynamic) Fair +   Standing Balance (Static) Fair -   Standing Balance (Dynamic) Fair -   Weight Shift Sitting Fair   Weight Shift Standing  Fair   Comments trunk stability good when sitting , fair with standing. ant/post sway, knees hyper ext   Bed Mobility    Supine to Sit Standby Assist   Sit to Supine Standby Assist   Scooting Standby Assist   Rolling Standby Assist   Gait Analysis   Gait Level Of Assist Minimal Assist  (without aD, CGA with FWW)   Assistive Device Front Wheel Walker   Distance (Feet) 75   # of Times Distance was Traveled 1   Deviation Increased Base Of Support;Decreased Heel Strike   # of Stairs Climbed 0   Vision Deficits Impacting Mobility none   Functional Mobility   Sit to Stand Minimal Assist   Bed, Chair, Wheelchair Transfer Contact Guard Assist   How much difficulty does the patient currently have...   Turning over in bed (including adjusting bedclothes, sheets and blankets)? 4   Sitting down on and standing up from a chair with arms (e.g., wheelchair, bedside commode, etc.) 3   Moving from lying on back to sitting on the side of the bed? 4   How much help from another person does the patient currently need...   Moving to and from a bed to a chair (including a wheelchair)? 3   Need to walk in a hospital room? 3   Climbing 3-5 steps with a railing? 2   6 clicks Mobility Score 19   Activity Tolerance   Sitting Edge of Bed 20 mins   Standing 10   Short Term Goals    Short Term Goal # 1 in 6 V pt will perform varitety surface transfers with indep   Short Term Goal # 2 in 6 V pt will AMB without AD x 300 feet with supervision   Short Term Goal # 3 in 6 V pt will climb up/down 10 stairs without rail and supervision   Education Group   Education Provided Role of Physical Therapist;Use of Assistive Device   Role of Physical Therapist Patient Response Patient;Acceptance;Explanation;Verbal Demonstration   Use of Assistive Device Patient Response Patient;Acceptance;Explanation;Verbal Demonstration   Physical Therapy Initial Treatment Plan    Treatment Plan  Gait Training;Stair Training;Self Care / Home Evaluation;Therapeutic Activities    Treatment Frequency 4 Times per Week   Duration Until Therapy Goals Met   Problem List    Problems Impaired Coordination;Decreased Activity Tolerance;Impaired Ambulation;Impaired Transfers   Anticipated Discharge Equipment and Recommendations   DC Equipment Recommendations Unable to determine at this time   Discharge Recommendations Recommend post-acute placement for additional physical therapy services prior to discharge home  (vs home with outpt)   Interdisciplinary Plan of Care Collaboration   IDT Collaboration with  Nursing;Occupational Therapist   Patient Position at End of Therapy In Bed;Phone within Reach;Tray Table within Reach;Call Light within Reach   Collaboration Comments re: mary anne   Session Information   Date / Session Number  9/19/23-1 ( 1/4, 9/25)

## 2023-09-19 NOTE — ED NOTES
Patient to floor via gurney with cardiac monitor in place accompanied by ED RN and friend. All belongings accounted for.

## 2023-09-19 NOTE — DISCHARGE PLANNING
[facility] Transitional Care Coordination    Referral from: Dr. Galicia    Insurance Provider on Facesheet: None listed @ this time.  Please reach out to a PFA for a screening.     Potential Rehab Diagnosis: TBD    Chart review indicates patient may have on going medical management and may have therapy needs to possibly meet inpatient rehab facility criteria with the goal of returning to community.    D/C support will need to be verified: Friend    Physiatry consultation pended per protocol.  W/U & TX pending.     Thank you for the referral.

## 2023-09-19 NOTE — PROGRESS NOTES
"Neurology Daily Progress Note    Date of Service  9/19/2023    Chief Complaint  38 y.o. male admitted 9/18/2023 with subjective fevers followed by paresthesias and weakness in the B/L UEs and LEs.     HPI:  Mr. Isabel is a 39yo male patient w/ pt reported history of \"myelitis\" likely transverse myelitis 7-8 years ago, who presented to the ED on 9/18/2023 with the above complaints. With patient's permission, we      He reportedly flew in from [place] recently, landed in the [place] on 9/8/2023, went to work on 9/11 which is when he started having some subjective fevers but no other respiratory/GI/ symptoms of infection. Fever resolved with some OTC meds. However, he then reports developing some weakness and paresthesias in the b/l lower extremities. These symptoms over the next two days started to ascend, to the waist. The sxs also worsened in nature, had numbness and tingling, increased sensitivity to touch, balance issues due to which he started having a wide based ataxic gait. He reports noticing these symptoms mainly in the lower extremities, worse on the left side.      He also reports some pain at the nape of the neck, started yesterday (09/17/2023). Pain is worse on movement, including on flexion of neck. He denies any neck stiffness but does report some limitation of ROM due to pain.      He also reports having a band like tightness around his trunk over the past few days, describes it as a \"Sarong Wrap\" around the upper abdomen.       He was reportedly diagnosed with Transverse myelitis (not sure but remembers someone mentioning \"myelitis\" and that it wasn't \"[place]\") about 7-8 years ago, had near identical presentation at that time, and was treated with IV medications (unclear if steroids vs IVIG) following which his symptoms had resolved.      He denies having any significant neurological history in his family.      No loss of bowel or bladder incontinence. No saddle anesthesia.   No recent falls or trauma " to the back/head. No new medications or vaccinations recently.   No known sick contacts recently, no difficulty with breathing/swallowing, no recent changes in vision or hearing.      Hospital course:     -MRI lumbar spine w/ and w/o contrast:   Mild lumbar degenerative changes w/o significant canal stenosis or foraminal narrowing. No abnormal enhancement is noted in the lumbar spine.   Annual fissure noted at the dorsal aspect of L3-4, which could represent an independent source of back pain.   (Radiology note also mentions: L3-L4 broad based disc bulge with right paracentral disc extrusion associated annular fissure, however there is only minimal canal narrowing, bilateral mild facet degeneration. L4-L5 Broad based disc bulge and bilateral facet degeneration with a superimposed central disc extrusion migrates caudally behind L5 causing mild canal narrowing, but w/o significant cauda equina compression.)     -MRI thoracic spine w/ and w/o contrast: MRI of the thoracic spine without and with contrast within normal limits.     -CT Abdomen and pelvis w/ contrast: No acute abnormality in the abdomen/pelvis.   -Blood work with CBC and CMP were largely unremarkable. Neg COVID, Flu, and RSV tests.      Interval Problem Update:    -Pt reports some subjective worsening of the paresthesias and weakness in the RLE today compared to yesterday. However, no objective changes noted on examination today.   -MRI of the brain and cervical spine w/ and w/o contrast are still pending. Hopefully will be done by this afternoon. LP to be done after that.     Code Status  Full Code    Review of Systems  Review of Systems   Constitutional:  Negative for chills, fever (6 days ago, subjective fevers), malaise/fatigue and weight loss.   HENT: Negative.  Negative for congestion, hearing loss and tinnitus.    Eyes: Negative.  Negative for blurred vision and double vision.   Respiratory: Negative.  Negative for cough, sputum production,  shortness of breath and wheezing.    Cardiovascular: Negative.    Gastrointestinal: Negative.  Negative for abdominal pain, diarrhea, heartburn, nausea and vomiting.   Genitourinary: Negative.    Musculoskeletal:  Positive for neck pain (Somewhat better today. Still has some limited ROM due to pain.). Negative for back pain, falls and joint pain.   Skin:  Negative for itching and rash.   Neurological:  Positive for tingling, sensory change, focal weakness and weakness. Negative for dizziness, tremors, speech change, seizures, loss of consciousness and headaches.   Endo/Heme/Allergies: Negative.    Psychiatric/Behavioral:  Positive for substance abuse (Smokes cigarettes).    All other systems reviewed and are negative.       Physical Exam  Temp:  [36.1 °C (96.9 °F)-36.4 °C (97.6 °F)] 36.1 °C (96.9 °F)  Pulse:  [56-87] 64  Resp:  [13-20] 16  BP: (114-138)/(68-87) 124/77  SpO2:  [93 %-97 %] 94 %    Neuro exam today is unchanged compared to 9/18.     Neurological Exam  Mental Status  Awake and alert. Oriented to person, place, time and situation. Recent and remote memory are intact. Speech is normal. Attention and concentration are normal. Fund of knowledge is appropriate for level of education.    Cranial Nerves  CN II: Visual acuity is normal. Visual fields full to confrontation.  CN III, IV, VI: Extraocular movements intact bilaterally. Normal lids and orbits bilaterally. Pupils equal round and reactive to light bilaterally.  CN V: Facial sensation is normal.  CN VII: Full and symmetric facial movement.  CN VIII: Hearing is normal.  CN IX, X: Palate elevates symmetrically. Normal gag reflex.  CN XI: Shoulder shrug strength is normal.  CN XII: Tongue midline without atrophy or fasciculations.    Motor  Normal muscle bulk throughout. Normal muscle tone. Strength is 5/5 in all four extremities except as noted. No pronator drift.                                             Right                     Left   Shoulder  abduction               5                          5   Shoulder adduction               5                          5  Elbow flexion                         5                          5  Elbow extension                    5                          5  Wrist flexion                           5                          5  Wrist extension                      5                          5  Finger flexion                         5                          5  Finger extension                    5                          5  Finger abduction                    5                          5  Thumb flexion                        5                          5  Thumb extension                   5                          5  Thumb abduction                   5                          5  Hip flexion                              5                          4+  Hip extension                         5                          4+  Knee flexion                           5                          5  Knee extension                      5                          5  Plantarflexion                         5                          5  Dorsiflexion                            5                          5                                             Right                     Left   Quadriceps                                                    4  Strength: Strength 4/5 in the LLE thigh flexion and extension, otherwise 5/5 everywhere else. .    Sensory  Light touch is normal in upper and lower extremities. Temperature abnormality: See under sensory level. . Vibration abnormality: Vibration sense reduced at the toes bilaterally. Intact in the b/l upper extremities. .   Right: There is a sensory level at T5.  Left: There is a sensory level at T4. Sensation level: Had notable sensory deficits over the back, from T4-T5 level and below. .    Reflexes                                            Right                      Left  Brachioradialis                     3+                         3+  Biceps                                 3+                         3+  Triceps                                3+                         3+  Finger flex                           3+                         3+  Hamstring                            3+                         3+  Patellar                                3+                         3+  Right Plantar: equivocal  Left Plantar: equivocal  Deep tendon reflexes: Plantar reflex equivocal bilaterally, favorable to downgoing. .    Right pathological reflexes: Echo's present. Ankle clonus present. Sustained.  Left pathological reflexes: Echo's present. Ankle clonus present. Sustained.  Pathological reflexes: Wartenberg's Sign negative bilaterally. .    Coordination  Right: Finger-to-nose normal.Left: Finger-to-nose normal.  Abnormal coordination in the LLE.  On Toe to Finger testing, he had notable ataxia in the left lower extremity. .    Gait  Casual gait: Ataxic gait. Wide-based, cautious, slow gait. . Tandem gait abnormality:        Fluids    Intake/Output Summary (Last 24 hours) at 9/19/2023 0817  Last data filed at 9/18/2023 2000  Gross per 24 hour   Intake 360 ml   Output --   Net 360 ml       Laboratory  Recent Labs     09/18/23  1015 09/19/23  0403   WBC 8.7 7.7   RBC 5.36 5.30   HEMOGLOBIN 15.5 15.3   HEMATOCRIT 45.5 44.6   MCV 84.9 84.2   MCH 28.9 28.9   MCHC 34.1 34.3   RDW 35.4* 35.3*   PLATELETCT 303 315   MPV 8.3* 8.2*     Recent Labs     09/18/23  1015 09/19/23  0403   SODIUM 136 135   POTASSIUM 4.6 4.4   CHLORIDE 100 100   CO2 25 25   GLUCOSE 101* 114*   BUN 9 18   CREATININE 1.02 0.96   CALCIUM 9.5 9.0     Recent Labs     09/19/23  0403   INR 0.96               Imaging  MR-THORACIC SPINE-WITH & W/O   Final Result         MRI of the thoracic spine without and with contrast within normal limits.      MR-LUMBAR SPINE-WITH & W/O   Final Result         Mild lumbar spine degenerative changes as described  above without significant canal stenosis or foraminal narrowing.      No abnormal enhancement is identified in the lumbar spine.      Annular fissure noted at the dorsal aspect of L3-4. This could represent an independent source of back pain.      CT-ABDOMEN-PELVIS WITH   Final Result      No acute abnormality of the abdomen or pelvis.      MR-BRAIN-WITH & W/O    (Results Pending)   MR-CERVICAL SPINE-WITH & W/O    (Results Pending)        Assessment/Plan     Summary:     Mr. Isabel is a 37yo male patient w/ pt reported personal hx of transverse myelitis over 7-8 years ago, w/o any residual focal neurological deficits at baseline, who presented to the ED on 9/18/2023 w/ subjective fevers 1 week ago followed by development of sxs like paresthesias, weakness, and ataxic gait. MRI of the thoracic and lumbar spine are negative for any extraaxial compressive etiology. Neurology consulted due to concerns for transverse myelitis.      Assessment:     -Pt presents with acute onset sensorimotor dysfunction that is attributable to the spinal cord, and presence of myelopathic sxs. No bowel/bladder incontinence or evidence of autonomic dysfunction at this time. Clinical presentation is certainly concerning for acute myelopathy, including Transverse Myelitis.   -There is a clearly defined sensory level around T4-T5 on exam and hx of band like tightness in the same region but interestingly his MRI of thoracic spine was neg for attributable lesions. He also has some focal motor weakness in the LLE (hips 4/5, 5/5 elsewhere).      Plan/Recommendations:     -Obtain MRI of the Brain and Cervical spine w/ and w/o contrast ASAP. Hopefully will be done by this afternoon.   -Lumbar puncture after MRI of the brain and cervical spine are completed. Labs will be ordered by Dr. Gonzalez. As of now, plan to order CSF glucose, protein, cell counts, gram stain and culture, and oligoclonal bands. May consider ordering other labs including VDRL, anti-AQP4  IgG and anti-MOG IgG autoantibodies, etc but would like to review MRI imaging before final recs.   -Given recent travel history, febrile illness, and meningismus at presentation, would recommend considering ID consult as well.   -Final treatment recs to follow after the above results are reviewed.     Discussed the above assessment and plan with patient, patient's friend/coworker, bedside RN, and my attending Dr. Gonzalez.      Thank you. Neurology team will continue to follow the patient.      Dr. Jennifer Rosenbaum MD           [facility] IM PGY 2 Resident     Please refer to Dr. Gonzalez's attestation for additional comments/recommendations.

## 2023-09-19 NOTE — ASSESSMENT & PLAN NOTE
Abnormal gait with lower extremity weakness and numbness  Started a week before admission  MRI lumbar and thoracic are normal  We will order MRI cervical and brain  LP after the MRI  Neurology was consulted

## 2023-09-20 PROBLEM — G37.3 TRANSVERSE MYELITIS (HCC): Status: ACTIVE | Noted: 2023-09-18

## 2023-09-20 LAB — VIT B12 SERPL-MCNC: 854 PG/ML (ref 211–911)

## 2023-09-20 PROCEDURE — 700111 HCHG RX REV CODE 636 W/ 250 OVERRIDE (IP): Mod: JZ | Performed by: INTERNAL MEDICINE

## 2023-09-20 PROCEDURE — A9270 NON-COVERED ITEM OR SERVICE: HCPCS | Performed by: INTERNAL MEDICINE

## 2023-09-20 PROCEDURE — 700102 HCHG RX REV CODE 250 W/ 637 OVERRIDE(OP): Performed by: INTERNAL MEDICINE

## 2023-09-20 PROCEDURE — 99233 SBSQ HOSP IP/OBS HIGH 50: CPT | Performed by: INTERNAL MEDICINE

## 2023-09-20 PROCEDURE — 36415 COLL VENOUS BLD VENIPUNCTURE: CPT

## 2023-09-20 PROCEDURE — 86052 AQUAPORIN-4 ANTB CBA EACH: CPT

## 2023-09-20 PROCEDURE — 82164 ANGIOTENSIN I ENZYME TEST: CPT

## 2023-09-20 PROCEDURE — 97535 SELF CARE MNGMENT TRAINING: CPT | Mod: CQ

## 2023-09-20 PROCEDURE — 99232 SBSQ HOSP IP/OBS MODERATE 35: CPT | Mod: GC | Performed by: PSYCHIATRY & NEUROLOGY

## 2023-09-20 PROCEDURE — 86362 MOG-IGG1 ANTB CBA EACH: CPT

## 2023-09-20 PROCEDURE — 86790 VIRUS ANTIBODY NOS: CPT

## 2023-09-20 PROCEDURE — 700105 HCHG RX REV CODE 258: Performed by: INTERNAL MEDICINE

## 2023-09-20 PROCEDURE — 82525 ASSAY OF COPPER: CPT

## 2023-09-20 PROCEDURE — 82607 VITAMIN B-12: CPT

## 2023-09-20 PROCEDURE — 770020 HCHG ROOM/CARE - TELE (206)

## 2023-09-20 PROCEDURE — 97116 GAIT TRAINING THERAPY: CPT | Mod: CQ

## 2023-09-20 RX ORDER — NICOTINE 21 MG/24HR
14 PATCH, TRANSDERMAL 24 HOURS TRANSDERMAL
Status: DISCONTINUED | OUTPATIENT
Start: 2023-09-20 | End: 2023-09-25 | Stop reason: HOSPADM

## 2023-09-20 RX ADMIN — SODIUM CHLORIDE 1000 MG: 9 INJECTION, SOLUTION INTRAVENOUS at 18:19

## 2023-09-20 RX ADMIN — SENNOSIDES AND DOCUSATE SODIUM 2 TABLET: 50; 8.6 TABLET ORAL at 17:51

## 2023-09-20 RX ADMIN — SENNOSIDES AND DOCUSATE SODIUM 2 TABLET: 50; 8.6 TABLET ORAL at 06:21

## 2023-09-20 ASSESSMENT — ENCOUNTER SYMPTOMS
SEIZURES: 0
NECK PAIN: 1
CHILLS: 0
LOSS OF CONSCIOUSNESS: 0
TREMORS: 0
RESPIRATORY NEGATIVE: 1
COUGH: 0
CARDIOVASCULAR NEGATIVE: 1
HEARTBURN: 0
BLURRED VISION: 0
HEADACHES: 0
WHEEZING: 0
SHORTNESS OF BREATH: 0
EYES NEGATIVE: 1
ABDOMINAL PAIN: 0
TINGLING: 1
NAUSEA: 0
SENSORY CHANGE: 1
WEIGHT LOSS: 0
DOUBLE VISION: 0
WEAKNESS: 1
BACK PAIN: 0
DIARRHEA: 0
SPEECH CHANGE: 0
FALLS: 0
FEVER: 0
VOMITING: 0
DIZZINESS: 0
FOCAL WEAKNESS: 1
GASTROINTESTINAL NEGATIVE: 1
SPUTUM PRODUCTION: 0

## 2023-09-20 ASSESSMENT — COGNITIVE AND FUNCTIONAL STATUS - GENERAL
MOBILITY SCORE: 20
WALKING IN HOSPITAL ROOM: A LITTLE
CLIMB 3 TO 5 STEPS WITH RAILING: A LITTLE
SUGGESTED CMS G CODE MODIFIER MOBILITY: CJ
MOVING FROM LYING ON BACK TO SITTING ON SIDE OF FLAT BED: A LITTLE
STANDING UP FROM CHAIR USING ARMS: A LITTLE

## 2023-09-20 ASSESSMENT — FIBROSIS 4 INDEX: FIB4 SCORE: 0.49

## 2023-09-20 ASSESSMENT — LIFESTYLE VARIABLES: SUBSTANCE_ABUSE: 1

## 2023-09-20 ASSESSMENT — GAIT ASSESSMENTS
GAIT LEVEL OF ASSIST: CONTACT GUARD ASSIST
ASSISTIVE DEVICE: FRONT WHEEL WALKER

## 2023-09-20 ASSESSMENT — VISUAL ACUITY: VA_NORMAL: 1

## 2023-09-20 NOTE — PROCEDURES
Procedure Lumbar Puncture    Date/Time: 9/19/2023 6:27 PM    Performed by: Ashok Estrada M.D.  Authorized by: Ashok Estrada M.D.    Consent:     Consent obtained:  Written    Consent given by:  Patient    Risks discussed:  Bleeding, infection, pain, nerve damage and headache    Alternatives discussed:  No treatment  Universal protocol:     Procedure explained and questions answered to patient or proxy's satisfaction: yes      Relevant documents present and verified: yes      Test results available and properly labeled: yes      Imaging studies available: yes      Required blood products, implants, devices, and special equipment available: yes      Immediately prior to procedure a time out was called: yes      Site/side marked: yes      Patient identity confirmed:  Hospital-assigned identification number and verbally with patient  Pre-procedure details:     Procedure purpose:  Diagnostic    Preparation: Patient was prepped and draped in usual sterile fashion    Sedation:     Sedation type:  None  Anesthesia:     Anesthesia method:  Local infiltration    Local anesthetic:  Lidocaine 1% w/o epi  Procedure details:     Lumbar space:  L3-L4 interspace    Patient position:  Sitting    Needle gauge:  20    Needle type:  Spinal needle - Quincke tip    Needle length (in):  3.5    Ultrasound guidance: no      Number of attempts:  1    Fluid appearance:  Clear    Tubes of fluid:  4    Total volume (ml): 16.  Post-procedure:     Puncture site:  Adhesive bandage applied    Patient tolerance of procedure:  Tolerated well, no immediate complications

## 2023-09-20 NOTE — THERAPY
"Occupational Therapy   Initial Evaluation     Patient Name: Alexandrea Isabel  Age:  38 y.o., Sex:  male  Medical Record #: [id number]  Today's Date: 9/19/2023       Precautions: Fall Risk    Assessment  Patient is 38 y.o. male with a hx of transverse myelitis over 7-8 years ago, w/o any residual focal neurological deficits at baseline, who presented to the ED on 9/18/2023 w/ subjective fevers 1 week ago followed by development of sxs like paresthesias, weakness, and ataxic gait. MRI of the thoracic and lumbar spine are negative.   [place] recently [place] the [place] 4 [place] work trip.    Today pt seen using Ipad  ([id number])  Pt very pleasant & co-operative & presents with impaired sensation in his BLE, ataxic gait & tends to hyperextend both knees while walking.  Pt was able to complete dressing tasks seated with SBA.  Pt stood at sink to groom with CGA.  Pt fearful to take steps but did better with FWW.  Pt noted to have slight scoliosis in his spine.  Pt is pending further testing & work up?  Unclear what pt may need at D/C?      Plan    Occupational Therapy Initial Treatment Plan   Treatment Interventions: Self Care / Activities of Daily Living, Adaptive Equipment, Neuro Re-Education / Balance, Therapeutic Exercises, Therapeutic Activity  Treatment Frequency: 3 Times per Week  Duration: Until Therapy Goals Met    DC Equipment Recommendations: Unable to determine at this time  Discharge Recommendations: Other - (may depend on medical findings & further testing)     Subjective    \"This similar thing happened to me 10 yrs ago\"     Objective      Prior Living Situation   Prior Services None   Housing / Facility Other (Comments)  (Hotel)   Elevator Yes   Equipment Owned None   Lives with - Patient's Self Care Capacity Alone and Able to Care For Self   Comments Pt stated he recently flew to [place] from [place] for a 4 month business trip.  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   History of Falls   History of Falls No   Precautions   Precautions Fall Risk   Vitals   O2 Delivery Device None - Room Air   Pain   Pain Scales 0 to 10 Scale    Pain 0 - 10 Group   Therapist Pain Assessment During Activity;Nurse Notified;0   Cognition    Cognition / Consciousness WDL   Level of Consciousness Alert   Comments pleasant & co-operaitve   Passive ROM Upper Body   Passive ROM Upper Body WDL   Active ROM Upper Body   Active ROM Upper Body  WDL   Strength Upper Body   Upper Body Strength  WDL   Sensation Upper Body   Upper Extremity Sensation  WDL   Coordination Upper Body   Coordination WDL   Balance Assessment   Sitting Balance (Static) Good   Sitting Balance (Dynamic) Fair +   Standing Balance (Static) Fair -   Standing Balance (Dynamic) Fair -   Weight Shift Sitting Good   Weight Shift Standing Fair   Comments pt did better with FWW   Bed Mobility    Supine to Sit Supervised   Sit to Supine Supervised   Scooting Supervised   Rolling Supervised   ADL Assessment   Eating Modified Independent   Grooming Contact Guard Assist;Standing   Upper Body Dressing Modified Independent   Lower Body Dressing Supervision   Toileting Supervision   Functional Mobility   Sit to Stand Minimal Assist   Bed, Chair, Wheelchair Transfer Contact Guard Assist   Toilet Transfers Contact Guard Assist   Transfer Method Stand Step   Activity Tolerance   Sitting Edge of  Bed 25   Standing 10   Patient / Family Goals   Patient / Family Goal #1 To get the feeling back in my legs   Short Term Goals   Short Term Goal # 1 Pt will amb to bathroom with SBA & FWW   Short Term Goal # 2 Pt will groom standing at the sink with no LOB & SBA   Education Group   Role of Occupational Therapist Patient Response Patient;Acceptance;Explanation;;Verbal Demonstration   Occupational Therapy Initial Treatment Plan    Treatment Interventions Self Care / Activities of Daily Living;Adaptive Equipment;Neuro Re-Education / Balance;Therapeutic Exercises;Therapeutic Activity   Treatment Frequency 3 Times per Week   Duration Until Therapy Goals Met   Problem List   Problem List Decreased Active Daily Living Skills;Decreased Homemaking Skills;Decreased Functional Mobility;Decreased Activity Tolerance;Impaired Postural Control / Balance;Other (Comments)  (numbness in BLE)   Anticipated Discharge Equipment and Recommendations   DC Equipment Recommendations Unable to determine at this time   Discharge Recommendations Other -  (may depend on medical findings & further testing)   Interdisciplinary Plan of Care Collaboration   IDT Collaboration with  Nursing;Physical Therapist   Patient Position at End of Therapy Seated;Edge of Bed;Call Light within Reach;Tray Table within Reach;Phone within Reach   Collaboration Comments Nsg notified of OT findings   Session Information   Date / Session Number  9/19 #1 (1/3, 9/25)

## 2023-09-20 NOTE — PROGRESS NOTES
"Neurology Daily Progress Note    Date of Service  9/20/2023    Chief Complaint  38 y.o. male admitted 9/18/2023 with subjective fevers followed by paresthesias and weakness in the B/L UEs and LEs.     HPI:  Mr. Isabel is a 39yo male patient w/ pt reported history of \"myelitis\" likely transverse myelitis 7-8 years ago, who presented to the ED on 9/18/2023 with the above complaints.      He reportedly flew in from [place] recently, [place] in the [place] on 9/8/2023, went to work on 9/11 which is when he started having some subjective fevers but no other respiratory/GI/ symptoms of infection. Fever resolved with some OTC meds. However, he then reports developing some weakness and paresthesias in the b/l lower extremities. These symptoms over the next two days started to ascend, to the waist. The sxs also worsened in nature, had numbness and tingling, increased sensitivity to touch, balance issues due to which he started having a wide based ataxic gait. He reports noticing these symptoms mainly in the lower extremities, worse on the left side.      He also reports some pain at the nape of the neck, started yesterday (09/17/2023). Pain is worse on movement, including on flexion of neck. He denies any neck stiffness but does report some limitation of ROM due to pain.      He also reports having a band like tightness around his trunk over the past few days, describes it as a \"Sarong Wrap\" around the upper abdomen.       He was reportedly diagnosed with Transverse myelitis (not sure but remembers someone mentioning \"myelitis\" and that it wasn't \"Guillain [place]\") about 7-8 years ago, had near identical presentation at that time, and was treated with IV medications (unclear if steroids vs IVIG) following which his symptoms had resolved.      He denies having any significant neurological history in his family.      No loss of bowel or bladder incontinence. No saddle anesthesia.   No recent falls or trauma to the back/head. No new " medications or vaccinations recently.   No known sick contacts recently, no difficulty with breathing/swallowing, no recent changes in vision or hearing.      MRI lumbar spine w/ and w/o contrast:   Mild lumbar degenerative changes w/o significant canal stenosis or foraminal narrowing. No abnormal enhancement is noted in the lumbar spine.   Annual fissure noted at the dorsal aspect of L3-4, which could represent an independent source of back pain.      MRI thoracic spine w/ and w/o contrast: MRI of the thoracic spine without and with contrast within normal limits.    MRI brain w/ and w/o contrast appears normal.     MRI cervical spine -- degenerative changes causing moderate canal stenosis at C3-C4. However, this doesn't explain his presentation and neuro deficits.      -CT Abdomen and pelvis w/ contrast: No acute abnormality in the abdomen/pelvis.   -Blood work with CBC and CMP were largely unremarkable. Neg COVID, Flu, and RSV tests.     The following blood tests are still pending: EBV, copper level    CSF analysis 9/19/2023:  Protein 62, total nucleated cells (46-95) with lymphocytic predominance.  Negative Gram stain.  Negative meningitis/encephalitis panel.  The following CSF labs are still pending: Oligoclonal band profile, autoimmune demyelinating disease panel, ACE, VDRL, HHV-6 PCR.     Interval Problem Update:  -No acute overnight neurological changes/events reported.   -LP done on 9/19/2023, results so far are consistent with lymphocytic pleocytosis and slightly elevated protein. This is consistent with CSF inflammation, non specific/don't indicate a particular etiology.     Code Status  Full Code    Review of Systems  Review of Systems   Constitutional:  Negative for chills, fever (6 days ago, subjective fevers), malaise/fatigue and weight loss.   HENT: Negative.  Negative for congestion, hearing loss and tinnitus.    Eyes: Negative.  Negative for blurred vision and double vision.   Respiratory: Negative.   Negative for cough, sputum production, shortness of breath and wheezing.    Cardiovascular: Negative.    Gastrointestinal: Negative.  Negative for abdominal pain, diarrhea, heartburn, nausea and vomiting.   Genitourinary: Negative.    Musculoskeletal:  Positive for neck pain (Somewhat better today. Still has some limited ROM due to pain.). Negative for back pain, falls and joint pain.   Skin:  Negative for itching and rash.   Neurological:  Positive for tingling, sensory change, focal weakness and weakness. Negative for dizziness, tremors, speech change, seizures, loss of consciousness and headaches.   Endo/Heme/Allergies: Negative.    Psychiatric/Behavioral:  Positive for substance abuse (Smokes cigarettes).    All other systems reviewed and are negative.       Physical Exam  Temp:  [36.1 °C (97 °F)-36.4 °C (97.6 °F)] 36.1 °C (97 °F)  Pulse:  [53-60] 53  Resp:  [16-17] 17  BP: (105-120)/(68-74) 105/72  SpO2:  [96 %-98 %] 96 %    Neuro exam today is unchanged compared to 9/18 and 9/19, except his ataxic gait appears improved when using a walker for assistance.     Neurological Exam  Mental Status  Awake and alert. Oriented to person, place, time and situation. Recent and remote memory are intact. Speech is normal. Attention and concentration are normal. Fund of knowledge is appropriate for level of education.    Cranial Nerves  CN II: Visual acuity is normal. Visual fields full to confrontation.  CN III, IV, VI: Extraocular movements intact bilaterally. Normal lids and orbits bilaterally. Pupils equal round and reactive to light bilaterally.  CN V: Facial sensation is normal.  CN VII: Full and symmetric facial movement.  CN VIII: Hearing is normal.  CN IX, X: Palate elevates symmetrically. Normal gag reflex.  CN XI: Shoulder shrug strength is normal.  CN XII: Tongue midline without atrophy or fasciculations.    Motor  Normal muscle bulk throughout. Normal muscle tone. Strength is 5/5 in all four extremities except  as noted. No pronator drift.                                             Right                     Left   Shoulder abduction               5                          5   Shoulder adduction               5                          5  Elbow flexion                         5                          5  Elbow extension                    5                          5  Wrist flexion                           5                          5  Wrist extension                      5                          5  Finger flexion                         5                          5  Finger extension                    5                          5  Finger abduction                    5                          5  Thumb flexion                        5                          5  Thumb extension                   5                          5  Thumb abduction                   5                          5  Hip flexion                              5                          4+  Hip extension                         5                          4+  Knee flexion                           5                          5  Knee extension                      5                          5  Plantarflexion                         5                          5  Dorsiflexion                            5                          5                                             Right                     Left   Quadriceps                                                    4  Strength: Strength 4/5 in the LLE thigh flexion and extension, otherwise 5/5 everywhere else. .    Sensory  Light touch is normal in upper and lower extremities. Temperature abnormality: See under sensory level. . Vibration abnormality: Vibration sense reduced at the toes bilaterally. Intact in the b/l upper extremities. .   Right: There is a sensory level at T5.  Left: There is a sensory level at T4. Sensation level: Had notable sensory deficits over the back, from T4-T5 level and below.  .    Reflexes                                            Right                      Left  Brachioradialis                    3+                         3+  Biceps                                 3+                         3+  Triceps                                3+                         3+  Finger flex                           3+                         3+  Hamstring                            3+                         3+  Patellar                                3+                         3+  Right Plantar: equivocal  Left Plantar: equivocal  Deep tendon reflexes: Plantar reflex equivocal bilaterally, favorable to downgoing. .    Right pathological reflexes: Echo's present. Ankle clonus present. Sustained.  Left pathological reflexes: Echo's present. Ankle clonus present. Sustained.  Pathological reflexes: Wartenberg's Sign negative bilaterally. .    Coordination  Right: Finger-to-nose normal.Left: Finger-to-nose normal.  Abnormal coordination in the LLE.  On Toe to Finger testing, he had notable ataxia in the left lower extremity. .    Gait  Casual gait: Ataxic gait. Wide-based, cautious, slow gait. . Tandem gait abnormality:  Watched the patient walk with PT later in the afternoon: ataxic gait appears improved when using a walker for assistance..        Fluids    Intake/Output Summary (Last 24 hours) at 9/20/2023 1635  Last data filed at 9/20/2023 0900  Gross per 24 hour   Intake 510 ml   Output --   Net 510 ml         Laboratory  Recent Labs     09/18/23  1015 09/19/23  0403   WBC 8.7 7.7   RBC 5.36 5.30   HEMOGLOBIN 15.5 15.3   HEMATOCRIT 45.5 44.6   MCV 84.9 84.2   MCH 28.9 28.9   MCHC 34.1 34.3   RDW 35.4* 35.3*   PLATELETCT 303 315   MPV 8.3* 8.2*       Recent Labs     09/18/23  1015 09/19/23  0403   SODIUM 136 135   POTASSIUM 4.6 4.4   CHLORIDE 100 100   CO2 25 25   GLUCOSE 101* 114*   BUN 9 18   CREATININE 1.02 0.96   CALCIUM 9.5 9.0       Recent Labs     09/19/23  0403   INR 0.96                  Imaging  MR-BRAIN-WITH & W/O   Final Result      No acute intracranial abnormality.      MR-CERVICAL SPINE-WITH & W/O   Final Result      Degenerative changes resulting in moderate bilateral foraminal stenosis at C3-4. No significant canal or foraminal stenosis elsewhere.         MR-THORACIC SPINE-WITH & W/O   Final Result         MRI of the thoracic spine without and with contrast within normal limits.      MR-LUMBAR SPINE-WITH & W/O   Final Result         Mild lumbar spine degenerative changes as described above without significant canal stenosis or foraminal narrowing.      No abnormal enhancement is identified in the lumbar spine.      Annular fissure noted at the dorsal aspect of L3-4. This could represent an independent source of back pain.      CT-ABDOMEN-PELVIS WITH   Final Result      No acute abnormality of the abdomen or pelvis.             Assessment/Plan     Summary:     Mr. Isabel is a 39yo male patient w/ pt reported personal hx of transverse myelitis over 7-8 years ago, w/o any residual focal neurological deficits at baseline, who presented to the ED on 9/18/2023 w/ subjective fevers 1 week ago followed by development of acute onset sensorimotor dysfunction that is attributable to the spinal cord, and presence of myelopathic sxs. No bowel/bladder incontinence or evidence of autonomic dysfunction at this time. Clinical presentation is certainly concerning for acute myelopathy, including Transverse Myelitis.     No subjective or objective changes are noted as of 9/20.    MRI of neuraxis with contrast has been negative for lesions that could explain his presentation.    CSF analysis shows mildly elevated protein level and lymphocytic pleocytosis.  Glucose level normal.  CSF meningitis and encephalitis panel was negative.  However, CSF analysis so far is consistent with a nonspecific inflammation etiology.   The clinical and CSF picture are not consistent with GBS, jennifer in the absence of  albuminocytological dissociation.     The patient has no obvious indicators of an ongoing infection, appears nontoxic and comfortable at rest.  However, given the recent travel history and subjective fevers in the past week it would be reasonable to get input from ID.       Plan/Recommendations:     -ID consult   -Recommend treating with IV solumedrol 1g/day x 5 days in total after running it by ID.   -Continue PT/OT     Discussed the above assessment and plan with patient, patient's friend/coworker, bedside RN, primary hospitalist Dr. Lobo, and Neuro attending Dr. Gonzalez.      Thank you. Neurology team will continue to follow the patient.      Dr. Jennifer Rosenbaum MD           [facility] IM PGY 2 Resident     Please refer to Dr. Gonzalez's attestation for additional comments/recommendations.

## 2023-09-20 NOTE — PROGRESS NOTES
Hospital Medicine Daily Progress Note    Date of Service  9/19/2023    Chief Complaint  Alexandrea Isabel is a 38 y.o. male admitted 9/18/2023 with Weakness (+fever, headache, reproducible abdominal pain, neck pain, numbness tingling BLE, weakness since Wednesday. Reports history of myelitis, 7 years ago. He feels like these symptoms are the same. )        Hospital Course  No notes on file  Working up leg paresthesia and weakness.  Interval Problem Update  9/19.  I reviewed the chart along with vitals, labs, imaging, test (both pending and resulted) and recommendations from specialists and interdisciplinary team.  I spent time with patient, arranging to get an LP reviewing the MRI and updating NEurology, ordeing LP studies  Patient is with family who translated. He still has leg weakness/paresthesia.   MRI resulted with mild degenrative disk disease.  I reviewed Neuro recs. Ordered LP.  Patient agreeable. Held DVT prophylaxis.    I have discussed this patient's plan of care and discharge plan at IDT rounds today with Case Management, Nursing, Nursing leadership, and other members of the IDT team.    Consultants/Specialty  neurology    Code Status  Full Code    Disposition  Medically Cleared  I have placed the appropriate orders for post-discharge needs.    Review of Systems  ROS     Physical Exam  Temp:  [36.1 °C (96.9 °F)-36.4 °C (97.6 °F)] 36.1 °C (96.9 °F)  Pulse:  [58-85] 70  Resp:  [14-16] 16  BP: (114-124)/(68-87) 124/87  SpO2:  [93 %-97 %] 97 %    Physical Exam    Fluids    Intake/Output Summary (Last 24 hours) at 9/19/2023 1920  Last data filed at 9/19/2023 1300  Gross per 24 hour   Intake 1080 ml   Output --   Net 1080 ml       Laboratory  Recent Labs     09/18/23  1015 09/19/23  0403   WBC 8.7 7.7   RBC 5.36 5.30   HEMOGLOBIN 15.5 15.3   HEMATOCRIT 45.5 44.6   MCV 84.9 84.2   MCH 28.9 28.9   MCHC 34.1 34.3   RDW 35.4* 35.3*   PLATELETCT 303 315   MPV 8.3* 8.2*     Recent Labs     09/18/23  1015 09/19/23  0403  "  SODIUM 136 135   POTASSIUM 4.6 4.4   CHLORIDE 100 100   CO2 25 25   GLUCOSE 101* 114*   BUN 9 18   CREATININE 1.02 0.96   CALCIUM 9.5 9.0     Recent Labs     09/19/23  0403   INR 0.96               Imaging  MR-BRAIN-WITH & W/O   Final Result      No acute intracranial abnormality.      MR-CERVICAL SPINE-WITH & W/O   Final Result      Degenerative changes resulting in moderate bilateral foraminal stenosis at C3-4. No significant canal or foraminal stenosis elsewhere.         MR-THORACIC SPINE-WITH & W/O   Final Result         MRI of the thoracic spine without and with contrast within normal limits.      MR-LUMBAR SPINE-WITH & W/O   Final Result         Mild lumbar spine degenerative changes as described above without significant canal stenosis or foraminal narrowing.      No abnormal enhancement is identified in the lumbar spine.      Annular fissure noted at the dorsal aspect of L3-4. This could represent an independent source of back pain.      CT-ABDOMEN-PELVIS WITH   Final Result      No acute abnormality of the abdomen or pelvis.           Assessment/Plan  * Lower extremity weakness  Assessment & Plan  Patient had same problem years ago and treated with steroid possible myelitis  MRI did not show any acute finding and will order MRI for cervical and brain  Patient might need LP after MRI  On exam patient has peripheral nerve disease, possible Guillain-[place]  Neurology on board\"    Reviewed MRI brain. No acute pathology  Reviewed MRI spine. Mild DJD  Spoke with Neurology  LP performed buy Dr. Estrada  Ordered Gram and [facility]  NEurology ordered autoimmune studies.    Ataxia- (present on admission)  Assessment & Plan  Abnormal gait with lower extremity weakness and numbness  Started a week before admission  MRI lumbar and thoracic are normal  We will order MRI cervical and brain  LP after the MRI  Neurology was consulted         VTE prophylaxis: Being held. For LP  I have performed a physical exam and reviewed " and updated ROS and Plan today (9/19/2023). In review of yesterday's note (9/18/2023), there are no changes except as documented above.

## 2023-09-20 NOTE — THERAPY
Physical Therapy   Daily Treatment     Patient Name: Alexandrea Isabel  Age:  38 y.o., Sex:  male  Medical Record #: [id number]  Today's Date: 9/20/2023     Precautions  Precautions: Fall Risk    Assessment    The pt resting w/friend BS. The pt is willing to participate w/PT. No assist required w/bed mobility and STS/transfers w/FWW. Improvement w/the pt's amb efforts from yesterday's PT session, tolerated hallway distances > 400' w/FWW @ CGA level. The pt demonstrated short distance wo/FWW requiring more assist 2* bilat LE ataxia/sensory deficits affecting his balance. The pt lives in a 2LH in [place] w/his wife but his friend lives here locally. The pt wants to be able to get strong enough to manage a flight home to [facility]. THe pt stated when he was diagnosed w/transverse myelitis 7 yrs ago he was much weaker than he is now. The pt is to start steroid tx today.   PT will cont to follow to monitor for changes w/his steroid tx.     PLAN    Treatment Plan Status: Continue Current Treatment Plan  Type of Treatment: Gait Training, Stair Training, Self Care / Home Evaluation, Therapeutic Activities  Treatment Frequency: 4 Times per Week  Treatment Duration: Until Therapy Goals Met    DC Equipment Recommendations: Front-Wheel Walker  Discharge Recommendations: Recommend outpatient physical therapy services to address higher level deficits    Objective       09/20/23 1558   Charge Group   PT Gait Training (Units) 2   PT Self Care / Home Evaluation (Units) 1   Total Time Spent   PT Gait Training Time Spent (Mins) 25   PT Self Care/Home Evaluation Time Spent (Mins) 15   PT Total Time Spent (Calculated) 40    Services   Is patient using  services for this encounter? No   Language Interpreted Latvian   Precautions   Precautions Fall Risk   Pain 0 - 10 Group   Pain Rating Scale (NPRS) 1   Other Treatments   Other Treatments Provided Conversation w/pt and friend BS regarding home. The pt lives in 2LH w/wife, he can stay  downstairs. His friend does live in [place] if needed as a interim until he can manage a 10 hr flight back to [place].   Balance   Sitting Balance (Static) Good   Sitting Balance (Dynamic) Good   Standing Balance (Static) Fair +   Standing Balance (Dynamic) Fair   Weight Shift Sitting Good   Weight Shift Standing Fair   Comments standing w/FWW   Bed Mobility    Supine to Sit Modified Independent   Sit to Supine Modified Independent   Scooting Modified Independent   Rolling Modified Independent   Gait Analysis   Gait Level Of Assist Contact Guard Assist   Assistive Device Front Wheel Walker   Distance (Feet)   (hallway distances > 400'.)   # of Times Distance was Traveled 1   Skilled Intervention Postural Facilitation   Comments Improvement w/pt's amb efforts from previous PT session. The pt managed increase in distance w/FWW, does better w/his tennis shoes. Short distance wo/FWW w/min assist 2* increase in ataxia 2* sensory deficits, the pt does c/o bilat foot numbness.   Functional Mobility   Sit to Stand Standby Assist   Bed, Chair, Wheelchair Transfer Standby Assist   Toilet Transfers Standby Assist   How much difficulty does the patient currently have...   Turning over in bed (including adjusting bedclothes, sheets and blankets)? 4   Sitting down on and standing up from a chair with arms (e.g., wheelchair, bedside commode, etc.) 3   Moving from lying on back to sitting on the side of the bed? 4   How much help from another person does the patient currently need...   Moving to and from a bed to a chair (including a wheelchair)? 3   Need to walk in a hospital room? 3   Climbing 3-5 steps with a railing? 3   6 clicks Mobility Score 20   Short Term Goals    Short Term Goal # 1 in 6 V pt will perform varitety surface transfers with indep   Goal Outcome # 1 goal not met   Short Term Goal # 2 in 6 V pt will AMB without AD x 300 feet with supervision   Goal Outcome # 2 Goal not met   Short Term Goal # 3 in 6 V pt will climb  up/down 10 stairs without rail and supervision   Goal Outcome # 3 Goal not met   Education Group   Role of Physical Therapist Patient Response Patient;Acceptance;Explanation;Reinforcement Needed   Physical Therapy Treatment Plan   Physical Therapy Treatment Plan Continue Current Treatment Plan   Anticipated Discharge Equipment and Recommendations   DC Equipment Recommendations Front-Wheel Walker   Discharge Recommendations Recommend outpatient physical therapy services to address higher level deficits   Interdisciplinary Plan of Care Collaboration   IDT Collaboration with  Nursing   Patient Position at End of Therapy In Bed;Call Light within Reach;Tray Table within Reach;Phone within Reach;Family / Friend in Room   Collaboration Comments Nrsg notified of pts tx efforts   Session Information   Date / Session Number  9/20--2 (2/4, 9/25) PTA/1   Supervising Physical Therapist (PTA Treatments Only)   Supervising Physical Therapist Loli Shaffer

## 2023-09-20 NOTE — DISCHARGE PLANNING
Patient currently listed as Self-Pay; Teams message sent to PFA requesting registration/demographics update.

## 2023-09-21 LAB
ACE SERPL-CCNC: 41 U/L (ref 16–85)
ALBUMIN SERPL BCP-MCNC: 4.5 G/DL (ref 3.2–4.9)
BUN SERPL-MCNC: 18 MG/DL (ref 8–22)
CALCIUM ALBUM COR SERPL-MCNC: 9.1 MG/DL (ref 8.5–10.5)
CALCIUM SERPL-MCNC: 9.5 MG/DL (ref 8.5–10.5)
CHLORIDE SERPL-SCNC: 100 MMOL/L (ref 96–112)
CO2 SERPL-SCNC: 23 MMOL/L (ref 20–33)
COPPER SERPL-MCNC: 128.7 UG/DL (ref 70–140)
CREAT SERPL-MCNC: 0.89 MG/DL (ref 0.5–1.4)
ERYTHROCYTE [DISTWIDTH] IN BLOOD BY AUTOMATED COUNT: 34.7 FL (ref 35.9–50)
GFR SERPLBLD CREATININE-BSD FMLA CKD-EPI: 112 ML/MIN/1.73 M 2
GLUCOSE SERPL-MCNC: 143 MG/DL (ref 65–99)
HCT VFR BLD AUTO: 47.9 % (ref 42–52)
HGB BLD-MCNC: 16.9 G/DL (ref 14–18)
MCH RBC QN AUTO: 29.2 PG (ref 27–33)
MCHC RBC AUTO-ENTMCNC: 35.3 G/DL (ref 32.3–36.5)
MCV RBC AUTO: 82.9 FL (ref 81.4–97.8)
PHOSPHATE SERPL-MCNC: 3.7 MG/DL (ref 2.5–4.5)
PLATELET # BLD AUTO: 417 K/UL (ref 164–446)
PMV BLD AUTO: 8.4 FL (ref 9–12.9)
POTASSIUM SERPL-SCNC: 4.3 MMOL/L (ref 3.6–5.5)
RBC # BLD AUTO: 5.78 M/UL (ref 4.7–6.1)
SODIUM SERPL-SCNC: 136 MMOL/L (ref 135–145)
WBC # BLD AUTO: 9.3 K/UL (ref 4.8–10.8)

## 2023-09-21 PROCEDURE — 770001 HCHG ROOM/CARE - MED/SURG/GYN PRIV*

## 2023-09-21 PROCEDURE — 36415 COLL VENOUS BLD VENIPUNCTURE: CPT

## 2023-09-21 PROCEDURE — 99232 SBSQ HOSP IP/OBS MODERATE 35: CPT | Mod: GC | Performed by: PSYCHIATRY & NEUROLOGY

## 2023-09-21 PROCEDURE — 97112 NEUROMUSCULAR REEDUCATION: CPT

## 2023-09-21 PROCEDURE — 700105 HCHG RX REV CODE 258: Performed by: INTERNAL MEDICINE

## 2023-09-21 PROCEDURE — 700102 HCHG RX REV CODE 250 W/ 637 OVERRIDE(OP): Performed by: INTERNAL MEDICINE

## 2023-09-21 PROCEDURE — 99232 SBSQ HOSP IP/OBS MODERATE 35: CPT | Performed by: INTERNAL MEDICINE

## 2023-09-21 PROCEDURE — 99255 IP/OBS CONSLTJ NEW/EST HI 80: CPT | Performed by: INTERNAL MEDICINE

## 2023-09-21 PROCEDURE — 80069 RENAL FUNCTION PANEL: CPT

## 2023-09-21 PROCEDURE — 85027 COMPLETE CBC AUTOMATED: CPT

## 2023-09-21 PROCEDURE — A9270 NON-COVERED ITEM OR SERVICE: HCPCS | Performed by: INTERNAL MEDICINE

## 2023-09-21 PROCEDURE — 700111 HCHG RX REV CODE 636 W/ 250 OVERRIDE (IP): Mod: JZ | Performed by: INTERNAL MEDICINE

## 2023-09-21 RX ADMIN — SODIUM CHLORIDE 1000 MG: 9 INJECTION, SOLUTION INTRAVENOUS at 05:10

## 2023-09-21 RX ADMIN — SENNOSIDES AND DOCUSATE SODIUM 2 TABLET: 50; 8.6 TABLET ORAL at 16:26

## 2023-09-21 RX ADMIN — SENNOSIDES AND DOCUSATE SODIUM 2 TABLET: 50; 8.6 TABLET ORAL at 05:08

## 2023-09-21 RX ADMIN — POLYETHYLENE GLYCOL 3350 1 PACKET: 17 POWDER, FOR SOLUTION ORAL at 12:19

## 2023-09-21 ASSESSMENT — ENCOUNTER SYMPTOMS
NAUSEA: 0
BLURRED VISION: 0
BACK PAIN: 0
EYES NEGATIVE: 1
WEIGHT LOSS: 0
TINGLING: 1
TREMORS: 0
COUGH: 0
SEIZURES: 0
SPUTUM PRODUCTION: 0
CHILLS: 0
DIZZINESS: 0
CARDIOVASCULAR NEGATIVE: 1
HEARTBURN: 0
SPEECH CHANGE: 0
ABDOMINAL PAIN: 0
NECK PAIN: 1
LOSS OF CONSCIOUSNESS: 0
VOMITING: 0
WEAKNESS: 1
SENSORY CHANGE: 1
FOCAL WEAKNESS: 1
DIARRHEA: 0
HEADACHES: 0
SHORTNESS OF BREATH: 0
WHEEZING: 0
RESPIRATORY NEGATIVE: 1
SPEECH CHANGE: 1
FALLS: 0
DOUBLE VISION: 0
GASTROINTESTINAL NEGATIVE: 1
FEVER: 0

## 2023-09-21 ASSESSMENT — COGNITIVE AND FUNCTIONAL STATUS - GENERAL
SUGGESTED CMS G CODE MODIFIER DAILY ACTIVITY: CH
DAILY ACTIVITIY SCORE: 24

## 2023-09-21 ASSESSMENT — PAIN DESCRIPTION - PAIN TYPE
TYPE: OTHER (COMMENT)
TYPE: ACUTE PAIN

## 2023-09-21 ASSESSMENT — FIBROSIS 4 INDEX: FIB4 SCORE: 0.49

## 2023-09-21 ASSESSMENT — LIFESTYLE VARIABLES: SUBSTANCE_ABUSE: 1

## 2023-09-21 NOTE — CARE PLAN
The patient is Stable - Low risk of patient condition declining or worsening    Shift Goals  Clinical Goals: stable neuro assessment  Patient Goals: walk around  Family Goals: MELISSA    Progress made toward(s) clinical / shift goals:  neuro assessment stable, pt has walked around today     Problem: Knowledge Deficit - Standard  Goal: Patient and family/care givers will demonstrate understanding of plan of care, disease process/condition, diagnostic tests and medications  Description: Target End Date:  1-3 days or as soon as patient condition allows    Document in Patient Education    1.  Patient and family/caregiver oriented to unit, equipment, visitation policy and means for communicating concern  2.  Complete/review Learning Assessment  3.  Assess knowledge level of disease process/condition, treatment plan, diagnostic tests and medications  4.  Explain disease process/condition, treatment plan, diagnostic tests and medications  Outcome: Progressing     Problem: Pain - Standard  Goal: Alleviation of pain or a reduction in pain to the patient’s comfort goal  Description: Target End Date:  Prior to discharge or change in level of care    Document on Vitals flowsheet    1.  Document pain using the appropriate pain scale per order or unit policy  2.  Educate and implement non-pharmacologic comfort measures (i.e. relaxation, distraction, massage, cold/heat therapy, etc.)  3.  Pain management medications as ordered  4.  Reassess pain after pain med administration per policy  5.  If opiods administered assess patient's response to pain medication is appropriate per POSS sedation scale  6.  Follow pain management plan developed in collaboration with patient and interdisciplinary team (including palliative care or pain specialists if applicable)  Outcome: Progressing  Note: States some discomfort to back when touched but does not want any pain meds        Patient is not progressing towards the following goals:

## 2023-09-21 NOTE — THERAPY
Occupational Therapy  Discharge      Patient Name: Alexandrea Isabel  Age:  38 y.o., Sex:  male  Medical Record #: [id number]  Today's Date: 9/21/2023     Precautions: Fall Risk    Assessment    Patient is 38 y.o. male with a hx of transverse myelitis over 7-8 years ago, w/o any residual focal neurological deficits at baseline, who presented to the ED on 9/18/2023 w/ subjective fevers 1 week ago followed by development of sxs like paresthesias, weakness, and ataxic gait. Pt seen for OT tx session,  iPad used during session. Pt declined to participate in ADLs today due to IND completing toileting and g/h prior to OT session, but wanted to get out of bed to walk in [place], used FWW and supervision. Anticipate that the patient will have no further occupational therapy needs after discharge from the hospital.     Plan    Reason for Discharge From Therapy: Discharge Secondary to Goals Met    DC Equipment Recommendations: None  Discharge Recommendations: Recommend outpatient occupational therapy services to address higher level deficits    Subjective    Pt reports pain in back and abdomen that is tender, also reported feeling good today.      Objective     09/21/23 1122   Treatment Charges   Charges Yes   OT Neuromuscular Re-education / Balance (Units) 1   Total Time Spent   OT Neuromuscular Re-education / Balance (Minutes) 12   OT Total Time Spent (Calculated) 12    Services   Is patient using  services for this encounter? Yes   Language Interpreted Spanish    Mode iPad   Content of Interpretation (select all) History/Visit information   Precautions   Precautions Fall Risk   Vitals   O2 Delivery Device None - Room Air   Pain   Pain Scales 0 to 10 Scale    Intervention Ambulation / Increased Activity   Pain 0 - 10 Group   Location Abdomen;Back   Location Orientation Anterior;Posterior   Description Tender   Therapist Pain Assessment During Activity;Nurse Notified;Post Activity Pain Same as  Prior to Activity   Cognition    Cognition / Consciousness WDL   Level of Consciousness Alert   Comments declined ADL participation due to already using bathroom and g/h   Passive ROM Upper Body   Passive ROM Upper Body WDL   Active ROM Upper Body   Active ROM Upper Body  WDL   Strength Upper Body   Upper Body Strength  WDL   Sensation Upper Body   Upper Extremity Sensation  WDL   Upper Body Muscle Tone   Upper Body Muscle Tone  WDL   Balance   Sitting Balance (Static) Good   Sitting Balance (Dynamic) Good   Standing Balance (Static) Good   Standing Balance (Dynamic) Good   Weight Shift Sitting Good   Weight Shift Standing Good   Skilled Intervention Verbal Cuing   Comments FWW to ambulate for safety   Bed Mobility    Supine to Sit Independent   Sit to Supine Independent   Scooting Independent   Rolling Independent   Skilled Intervention Verbal Cuing   Activities of Daily Living   Lower Body Dressing Independent  (donned shoes)   Functional Mobility   Sit to Stand Independent   Bed, Chair, Wheelchair Transfer Independent   Mobility Supervision with FWW   Skilled Intervention Verbal Cuing   Visual Perception   Visual Perception  WDL   Comments glasses   Activity Tolerance   Standing 10   Patient / Family Goals   Patient / Family Goal #1 To get the feeling back in my legs   Short Term Goals   Short Term Goal # 1 Pt will amb to bathroom with SBA & FWW   Goal Outcome # 1 Goal met   Short Term Goal # 2 Pt will groom standing at the sink with no LOB & SBA   Goal Outcome # 2 Goal met   Education Group   Education Provided Role of Occupational Therapist   Role of Occupational Therapist Patient Response Patient;Acceptance;Explanation;Verbal Demonstration   Occupational Therapy Treatment Plan    O.T. Treatment Plan Modify Current Treatment Plan   Reason For Discharge Discharge Secondary to Goals Met   Anticipated Discharge Equipment and Recommendations   DC Equipment Recommendations None   Discharge Recommendations Recommend  outpatient occupational therapy services to address higher level deficits   Interdisciplinary Plan of Care Collaboration   IDT Collaboration with  Nursing   Patient Position at End of Therapy Seated;In Bed;Call Light within Reach   Collaboration Comments Nursing notified of OT findings   Session Information   Date / Session Number  9/21 #2 (2/3, 9/27)   OTHER   Modified Plan Discharge Secondary to Goals Met

## 2023-09-21 NOTE — PROGRESS NOTES
"Neurology Daily Progress Note    Date of Service  9/21/2023    Chief Complaint  38 y.o. male admitted 9/18/2023 with subjective fevers followed by paresthesias and weakness in the B/L UEs and LEs.     HPI:  Mr. Isabel is a 39yo male patient w/ pt reported history of \"myelitis\" likely transverse myelitis 7-8 years ago, who presented to the ED on 9/18/2023 with the above complaints.      He reportedly flew in from [place] recently, [place] in the [place] on 9/8/2023, went to work on 9/11 which is when he started having some subjective fevers but no other respiratory/GI/ symptoms of infection. Fever resolved with some OTC meds. However, he then reports developing some weakness and paresthesias in the b/l lower extremities. These symptoms over the next two days started to ascend, to the waist. The sxs also worsened in nature, had numbness and tingling, increased sensitivity to touch, balance issues due to which he started having a wide based ataxic gait. He reports noticing these symptoms mainly in the lower extremities, worse on the left side.      He also reports some pain at the nape of the neck, started yesterday (09/17/2023). Pain is worse on movement, including on flexion of neck. He denies any neck stiffness but does report some limitation of ROM due to pain.      He also reports having a band like tightness around his trunk over the past few days, describes it as a \"Sarong Wrap\" around the upper abdomen.       He was reportedly diagnosed with Transverse myelitis (not sure but remembers someone mentioning \"myelitis\" and that it wasn't \"Guillain [place]\") about 7-8 years ago, had near identical presentation at that time, and was treated with IV medications (unclear if steroids vs IVIG) following which his symptoms had resolved.      He denies having any significant neurological history in his family.      No loss of bowel or bladder incontinence. No saddle anesthesia.   No recent falls or trauma to the back/head. No new " medications or vaccinations recently.   No known sick contacts recently, no difficulty with breathing/swallowing, no recent changes in vision or hearing.      MRI lumbar spine w/ and w/o contrast:   Mild lumbar degenerative changes w/o significant canal stenosis or foraminal narrowing. No abnormal enhancement is noted in the lumbar spine.   Annual fissure noted at the dorsal aspect of L3-4, which could represent an independent source of back pain.      MRI thoracic spine w/ and w/o contrast: MRI of the thoracic spine without and with contrast within normal limits.    MRI brain w/ and w/o contrast appears normal.     MRI cervical spine -- degenerative changes causing moderate canal stenosis at C3-C4. However, this doesn't explain his presentation and neuro deficits.      -CT Abdomen and pelvis w/ contrast: No acute abnormality in the abdomen/pelvis.   -Blood work with CBC and CMP were largely unremarkable. Neg COVID, Flu, and RSV tests.     The following blood tests are still pending: EBV, copper level    CSF analysis 9/19/2023:  Protein 62, total nucleated cells (46-95) with lymphocytic predominance.  Negative Gram stain.  Negative meningitis/encephalitis panel.  The following CSF labs are still pending: Oligoclonal band profile, autoimmune demyelinating disease panel, ACE, VDRL, HHV-6 PCR.     Interval Problem Update:  -No acute overnight neurological changes/events reported.   -ID consulted by hospitalist Dr. Lobo yesterday. Dr. Beltran has added on [place] virus ab in serum and csf labs.   -IV Solu-Medrol 1 g daily x5 days was started on 9/20-.    Code Status  Full Code    Review of Systems  Review of Systems   Constitutional:  Negative for chills, fever (6 days ago, subjective fevers), malaise/fatigue and weight loss.   HENT: Negative.  Negative for congestion, hearing loss and tinnitus.    Eyes: Negative.  Negative for blurred vision and double vision.   Respiratory: Negative.  Negative for cough, sputum  production, shortness of breath and wheezing.    Cardiovascular: Negative.    Gastrointestinal: Negative.  Negative for abdominal pain, diarrhea, heartburn, nausea and vomiting.   Genitourinary: Negative.    Musculoskeletal:  Positive for neck pain (Somewhat better today. Still has some limited ROM due to pain.). Negative for back pain, falls and joint pain.   Skin:  Negative for itching and rash.   Neurological:  Positive for tingling, sensory change, focal weakness and weakness. Negative for dizziness, tremors, speech change, seizures, loss of consciousness and headaches.   Endo/Heme/Allergies: Negative.    Psychiatric/Behavioral:  Positive for substance abuse (Smokes cigarettes).    All other systems reviewed and are negative.       Physical Exam  Temp:  [36.1 °C (97 °F)-36.7 °C (98.1 °F)] 36.7 °C (98.1 °F)  Pulse:  [63-83] 83  Resp:  [17-18] 18  BP: (107-122)/(67-76) 122/76  SpO2:  [93 %-100 %] 94 %    Neurologic exam has remained unchanged since admission.    Neurological Exam  Mental Status  Awake and alert. Oriented to person, place, time and situation. Speech is normal.    Cranial Nerves  Cranial nerves II to XII normal.  Unchanged since admission..    Motor  Normal muscle bulk throughout. Normal muscle tone. No pronator drift.                                             Right                     Left   Quadriceps                                                    4  Strength: Strength 4/5 in the LLE thigh flexion and extension, otherwise 5/5 everywhere else. .  Strength 5/5 throughout except slightly weaker left hip flexors..    Sensory  Light touch is normal in upper and lower extremities. Temperature abnormality: See under sensory level. . Vibration abnormality: Vibration sense reduced at the toes bilaterally. Intact in the b/l upper extremities. .   Right: There is a sensory level at T5.  Left: There is a sensory level at T4. Sensation level: Had notable sensory deficits over the back, from T4-T5 level  and below. .    Reflexes                                            Right                      Left  Brachioradialis                    3+                         3+  Biceps                                 3+                         3+  Triceps                                3+                         3+  Finger flex                           3+                         3+  Hamstring                            3+                         3+  Patellar                                3+                         3+  Right Plantar: equivocal  Left Plantar: equivocal  Deep tendon reflexes: Plantar reflex equivocal bilaterally, favorable to downgoing. .    Right pathological reflexes: Echo's present. Ankle clonus present. Sustained.  Left pathological reflexes: Echo's present. Ankle clonus present. Sustained.  Pathological reflexes: Wartenberg's Sign negative bilaterally. .    Coordination  Right: Finger-to-nose normal.Left: Finger-to-nose normal.  Abnormal coordination in the LLE.  Continues to have some ataxia in the left lower extremity on  toe to finger testing..    Gait  Casual gait: Ataxic gait. Wide-based, cautious, slow gait. . Tandem gait abnormality:  Ataxic gait is less pronounced while walking with the assistance of a walker. .        Fluids    Intake/Output Summary (Last 24 hours) at 9/21/2023 1038  Last data filed at 9/21/2023 1000  Gross per 24 hour   Intake 200 ml   Output --   Net 200 ml         Laboratory  Recent Labs     09/19/23  0403 09/21/23  0345   WBC 7.7 9.3   RBC 5.30 5.78   HEMOGLOBIN 15.3 16.9   HEMATOCRIT 44.6 47.9   MCV 84.2 82.9   MCH 28.9 29.2   MCHC 34.3 35.3   RDW 35.3* 34.7*   PLATELETCT 315 417   MPV 8.2* 8.4*       Recent Labs     09/19/23  0403 09/21/23  0345   SODIUM 135 136   POTASSIUM 4.4 4.3   CHLORIDE 100 100   CO2 25 23   GLUCOSE 114* 143*   BUN 18 18   CREATININE 0.96 0.89   CALCIUM 9.0 9.5       Recent Labs     09/19/23 0403   INR 0.96                  Imaging  MR-BRAIN-WITH & W/O   Final Result      No acute intracranial abnormality.      MR-CERVICAL SPINE-WITH & W/O   Final Result      Degenerative changes resulting in moderate bilateral foraminal stenosis at C3-4. No significant canal or foraminal stenosis elsewhere.         MR-THORACIC SPINE-WITH & W/O   Final Result         MRI of the thoracic spine without and with contrast within normal limits.      MR-LUMBAR SPINE-WITH & W/O   Final Result         Mild lumbar spine degenerative changes as described above without significant canal stenosis or foraminal narrowing.      No abnormal enhancement is identified in the lumbar spine.      Annular fissure noted at the dorsal aspect of L3-4. This could represent an independent source of back pain.      CT-ABDOMEN-PELVIS WITH   Final Result      No acute abnormality of the abdomen or pelvis.             Assessment/Plan     Summary:     Mr. Isabel is a 39yo male patient w/ pt reported personal hx of transverse myelitis over 7-8 years ago, who presented to the ED on 9/18/2023 w/ subjective fevers 1 week ago followed by development of acute onset sensorimotor dysfunction, and features suggestive of myelopathy. Clinically localized the lesion to the spinal cord but no radiological evidence of a lesion on the MRI of neuro axis that would explain this presentation. CSF with mildly elevated protein level and lymphocytic pleocytosis. Normal CSF glucose and neg meningitis and encephalitis panel. In summary, CSF analysis so far is consistent with nonspecific CNS inflammation.     As of 9/21, has received 1 dose of IV solumedrol 1g. No subjective/objective changes noted.  No bladder incontinence or evidence of autonomic dysfunction at this time.     Acute myelopathy remains at the top of the differential, including Transverse Myelitis.   The clinical and CSF picture are not consistent with GBS, jennifer in the absence of albuminocytological dissociation.     ID aware of the patient,  have added on additional labs for [place] virus Ab in serum and the CSF.      Plan/Recommendations:     -Continue with IV solumedrol 1g/day x 5 days in total. Started 9/20-   -Continue PT/OT   -Patient will likely be a good rehab candidate.  Consider physiatry consult.  -Follow-up pending lab work      Thank you. Neurology team will continue to follow the patient.      Dr. Jennifer Rosenbaum MD           [facility] PGY 2 Resident     Please refer to Dr. Gonzalez's attestation for additional comments/recommendations.

## 2023-09-21 NOTE — CARE PLAN
The patient is Stable - Low risk of patient condition declining or worsening    Shift Goals  Clinical Goals: no neuro changes, safety, no falls  Patient Goals: rest, diagnosis  Family Goals: MELISSA    Progress made toward(s) clinical / shift goals:    Problem: Knowledge Deficit - Standard  Goal: Patient and family/care givers will demonstrate understanding of plan of care, disease process/condition, diagnostic tests and medications  Description: Target End Date:  1-3 days or as soon as patient condition allows    Document in Patient Education    1.  Patient and family/caregiver oriented to unit, equipment, visitation policy and means for communicating concern  2.  Complete/review Learning Assessment  3.  Assess knowledge level of disease process/condition, treatment plan, diagnostic tests and medications  4.  Explain disease process/condition, treatment plan, diagnostic tests and medications  Outcome: Progressing       Patient is not progressing towards the following goals:

## 2023-09-21 NOTE — CONSULTS
"[facility] INFECTIOUS DISEASES INPATIENT CONSULT NOTE     Date of Service: 9/21/2023    Consult Requested By: Luis Lobo M.D.    Reason for Consultation: Lymphocytic pleocytosis on CSF    Chief Complaint: Weakness    History of Present Illness:     Alexandrea Isabel is a 38 y.o. male admitted 9/18/2023.  Extensive review of documentation from multiple specialties performed in generating this HPI.  Patient with reported history of \"myelitis\" approximately 10 years ago while in [place] and was treated with IV steroids.  At that time, he states he had significant weakness and was unable to stand, in addition to feeling numbness below his waist.  He was in the hospital for about 1 month and the recovery was slow.  He notes that he was not on any antibiotics and he was told that it was not an infection.  Patient flew in from [place] for work on 9/8/2023 and about 3 days later began to experience some subjective fevers with no other issues.  These resolved but following this he began to develop lower extremity paresthesias bilaterally.  The stent slowly ascended to the waist, worsened, began to experience numbness and tingling with sensitivity to touch, balance issues, having to walk with a wide-based, worse on the left side.  This was also associated with some neck pain worse on flexion, these began more recently about 4 days ago.  Patient notes that the symptoms are identical to what he experienced in [place] 10 years ago with the exception that it is not as severe and not associated with significant weakness.    CSF showed 95 white cells, lymphocyte predominance, no eosinophils, mild elevated protein of 62, negative meningitis encephalitis PCR panel.  Neurology on board and considering an MRI negative transverse myelitis and has started high-dose IV steroids.  Multiple other labs obtained and pending.  Patient is afebrile, negative procalcitonin, white count 9.3, ESR of 2, creatinine 0.89, normal transaminases.    MRI with " contrast of the brain and the entire spine are negative.  Patient works at [employer], primarily indoors.  He lives in [place].  He has a rabbit.    Review of Systems:  All other systems reviewed and are negative expect as noted in HPI    Past Medical History:   Diagnosis Date    Myelitis (HCC)        History reviewed. No pertinent surgical history.    History reviewed. No pertinent family history.    Social History     Socioeconomic History    Marital status: Single     Spouse name: Not on file    Number of children: Not on file    Years of education: Not on file    Highest education level: Not on file   Occupational History    Not on file   Tobacco Use    Smoking status: Every Day     Current packs/day: 1.00     Types: Cigarettes    Smokeless tobacco: Never   Vaping Use    Vaping Use: Never used   Substance and Sexual Activity    Alcohol use: Not Currently    Drug use: Not Currently    Sexual activity: Not on file   Other Topics Concern    Not on file   Social History Narrative    Not on file     Social Determinants of Health     Financial Resource Strain: Not on file   Food Insecurity: Not on file   Transportation Needs: Not on file   Physical Activity: Not on file   Stress: Not on file   Social Connections: Not on file   Intimate Partner Violence: Not on file   Housing Stability: Not on file       No Known Allergies    Medications:    Current Facility-Administered Medications:     nicotine (Nicoderm) 14 MG/24HR 14 mg, 14 mg, Transdermal, Daily-0600 **AND** Nicotine Replacement Patient Education Materials, , , Once **AND** nicotine polacrilex (Nicorette) 2 MG piece 2 mg, 2 mg, Oral, Q HOUR PRN, Jennifer Rosenbaum M.D.    methylPREDNISolone sod succ (Solu-Medrol) 1,000 mg in  mL IVPB, 1 g, Intravenous, DAILY, Luis Lobo M.D., Stopped at 09/21/23 0610    senna-docusate (Pericolace Or Senokot S) 8.6-50 MG per tablet 2 Tablet, 2 Tablet, Oral, BID, 2 Tablet at 09/21/23 0508 **AND** polyethylene  "glycol/lytes (Miralax) PACKET 1 Packet, 1 Packet, Oral, QDAY PRN **AND** magnesium hydroxide (Milk Of Magnesia) suspension 30 mL, 30 mL, Oral, QDAY PRN **AND** bisacodyl (Dulcolax) suppository 10 mg, 10 mg, Rectal, QDAY PRN, Imer Galicia M.D.    [Held by provider] enoxaparin (Lovenox) inj 40 mg, 40 mg, Subcutaneous, DAILY AT 1800, Imer Galicia M.D.    acetaminophen (Tylenol) tablet 650 mg, 650 mg, Oral, Q6HRS PRN, Imer Galicia M.D., 650 mg at 23    Physical Exam:   Vital Signs: /76   Pulse 83   Temp 36.7 °C (98.1 °F) (Rectal)   Resp 18   Ht 1.65 m (5' 4.96\")   Wt 58.5 kg (128 lb 15.5 oz)   SpO2 94%   BMI 21.46 kg/m²   Temp  Av.3 °C (97.3 °F)  Min: 36.1 °C (96.9 °F)  Max: 36.7 °C (98.1 °F)  Vital signs reviewed  Constitutional: Patient is oriented to person, place, and time. Appears well-developed and thin. No distress  Head: Atraumatic, normocephalic  Eyes: Conjunctivae normal, EOM intact  Mouth/Throat: Lips without lesions  Neck: Neck supple. No masses/lymphadenopathy  Cardiovascular: Normal rate, regular rhythm. No pedal edema.  Pulmonary/Chest: No respiratory distress. Unlabored respiratory effort  Abdominal: Soft, non tender  Musculoskeletal: No joint tenderness, swelling, erythema, or restriction of motion noted.  Neurological: Paresthesias bilaterally from the waist down.  Intact power  Skin: Skin is warm and dry. No rashes or embolic phenomena noted on exposed skin  Psychiatric: Normal mood and affect. Behavior is normal.     LABS:  Recent Labs     23  1015 23  0403 23  0345   WBC 8.7 7.7 9.3      Recent Labs     23  1015 23  0403 23  0345   HEMOGLOBIN 15.5 15.3 16.9   HEMATOCRIT 45.5 44.6 47.9   MCV 84.9 84.2 82.9   MCH 28.9 28.9 29.2   PLATELETCT 303 315 417       Recent Labs     23  1015 23  0403 23  0345   SODIUM 136 135 136   POTASSIUM 4.6 4.4 4.3   CHLORIDE 100 100 100   CO2 25 25 23   CREATININE 1.02 " "0.96 0.89        Recent Labs     09/18/23  1015 09/21/23  0345   ALBUMIN 4.3 4.5        MICRO:  No results found for: \"BLOODCULTU\", \"BLDCULT\", \"BCHOLD\"     Latest pertinent labs were reviewed    IMAGING STUDIES:  As above    Hospital Course/Assessment:   Alexandrea Isabel is a pleasant 38 y.o. Bangl[place]hi male patient with a reported history of myelitis approximately 10 years ago in [place], treated with IV steroids with resolution in 1 month, began to feel ill approximately 3 days after arriving in [place] on 9/8/2023 (visiting for work from [employer]).  See details as above, currently admitted with worsening bilateral lower extremity and ascending paresthesias, numbness and tingling with sensitivity to touch, balance issues with wide ataxic gait, and more recently neck stiffness.  Patient notes that the symptoms are identical to the prior episode 10 years ago but less intensity and not associated with significant weakness he had back then. CSF showed 95 white cells, lymphocyte predominance, no eosinophils, mild elevated protein of 62, negative meningitis encephalitis PCR panel.  Patient appears well and nontoxic, is afebrile, has no leukocytosis or elevated inflammatory markers.  SARS-CoV-2 PCR negative Neurology has evaluated patient and obtained additional labs including HIV and Treponema antibodies which are negative.  Other pending labs include EBV serum PCR, CSF CMV and HHV-6 PCR, autoimmune demyelinating disease panel, ACE level, oligoclonal bands.  CSF cultures are pending and no growth till date.  No eosinophils noted in the CSF fluid analysis.  No other organ dysfunction noted on labs.  No attached ticks noted, no unusual compatible zoonotic exposure.  No albumin-known 5000 maybe not that much weight I can tell on my cytologic dissociation. MRI with contrast of the brain and whole spine are negative.    Neurology is considering this to be consistent with MRI negative transverse myelitis and has started high-dose IV " steroids.  Agree with neurological assessment.  A Herpesviridae viral meningitis could cause recurrent episodes but the neurological manifestations would be unusual.  HSV and CMV, VZV PCR on the CSF are negative.  We will add on West Nile antibody testing.    Pertinent Diagnoses:  Lymphocytic pleocytosis and elevated protein on CSF  Suspected transverse myelitis    Plan:   -Agree with current neurology work-up and management plan  -Will add on West Nile antibodies to the CSF and serum  -Follow-up pending stand-alone testing for CMV, EBV, HHV-6 PCR    Plan was discussed with the primary team, Dr. Sofia.  ID will sign off.  Please call with questions or if any changes.  This illness poses a threat to neurological function.    Devante Beltran M.D.    Please note that this dictation was created using voice recognition software. I have worked with technical experts from [facility] to optimize the interface.  I have made every reasonable attempt to correct obvious errors, but there may be errors of grammar and possibly content that I did not discover before finalizing the note.

## 2023-09-21 NOTE — PROGRESS NOTES
Hospital Medicine Daily Progress Note    Date of Service  9/20/2023    Chief Complaint  Alexandrea Isabel is a 38 y.o. male admitted 9/18/2023 with Weakness (+fever, headache, reproducible abdominal pain, neck pain, numbness tingling BLE, weakness since Wednesday. Reports history of myelitis, 7 years ago. He feels like these symptoms are the same. )        Hospital Course  No notes on file  Working up leg paresthesia and weakness.  Interval Problem Update  9/19.  I reviewed the chart along with vitals, labs, imaging, test (both pending and resulted) and recommendations from specialists and interdisciplinary team.  I spent time with patient, arranging to get an LP reviewing the MRI and updating NEurology, ordeing LP studies  Patient is with family who translated. He still has leg weakness/paresthesia.   MRI resulted with mild degenrative disk disease.  I reviewed Neuro recs. Ordered LP.  Patient agreeable. Held DVT prophylaxis.  9/20. Reveiwed LP results and discussed with Neurology  Transverse myelitis  I consulted ID on neurology's behalf. Dr. Beltran ordered further CSF viral studies.  Patient improving and walking with assistance. Family present.  I started steroids.    I have discussed this patient's plan of care and discharge plan at IDT rounds today with Case Management, Nursing, Nursing leadership, and other members of the IDT team.    Consultants/Specialty  neurology    Code Status  Full Code    Disposition  The patient is not medically cleared for discharge to home or a post-acute facility.      I have placed the appropriate orders for post-discharge needs.    Review of Systems  Review of Systems   Neurological:  Positive for speech change and weakness.        Physical Exam  Temp:  [36.1 °C (97 °F)-36.4 °C (97.6 °F)] 36.1 °C (97 °F)  Pulse:  [53-65] 63  Resp:  [16-18] 18  BP: (105-120)/(67-74) 111/72  SpO2:  [96 %-100 %] 100 %    Physical Exam  Neurological:      Motor: Weakness present.  "        Fluids    Intake/Output Summary (Last 24 hours) at 9/20/2023 2032  Last data filed at 9/20/2023 0900  Gross per 24 hour   Intake 460 ml   Output --   Net 460 ml         Laboratory  Recent Labs     09/18/23  1015 09/19/23  0403   WBC 8.7 7.7   RBC 5.36 5.30   HEMOGLOBIN 15.5 15.3   HEMATOCRIT 45.5 44.6   MCV 84.9 84.2   MCH 28.9 28.9   MCHC 34.1 34.3   RDW 35.4* 35.3*   PLATELETCT 303 315   MPV 8.3* 8.2*       Recent Labs     09/18/23  1015 09/19/23  0403   SODIUM 136 135   POTASSIUM 4.6 4.4   CHLORIDE 100 100   CO2 25 25   GLUCOSE 101* 114*   BUN 9 18   CREATININE 1.02 0.96   CALCIUM 9.5 9.0       Recent Labs     09/19/23  0403   INR 0.96                 Imaging  MR-BRAIN-WITH & W/O   Final Result      No acute intracranial abnormality.      MR-CERVICAL SPINE-WITH & W/O   Final Result      Degenerative changes resulting in moderate bilateral foraminal stenosis at C3-4. No significant canal or foraminal stenosis elsewhere.         MR-THORACIC SPINE-WITH & W/O   Final Result         MRI of the thoracic spine without and with contrast within normal limits.      MR-LUMBAR SPINE-WITH & W/O   Final Result         Mild lumbar spine degenerative changes as described above without significant canal stenosis or foraminal narrowing.      No abnormal enhancement is identified in the lumbar spine.      Annular fissure noted at the dorsal aspect of L3-4. This could represent an independent source of back pain.      CT-ABDOMEN-PELVIS WITH   Final Result      No acute abnormality of the abdomen or pelvis.             Assessment/Plan  * Transverse myelitis (HCC)  Assessment & Plan  Patient had same problem years ago and treated with steroid possible myelitis  MRI did not show any acute finding and will order MRI for cervical and brain  Patient might need LP after MRI  On exam patient has peripheral nerve disease, possible Guillain-Barré  Neurology on board\"    Reviewed MRI brain. No acute pathology  Reviewed MRI spine. Mild " BRANDY  Spoke with Neurology  LP performed buy Dr. Estrada  Ordered Gram and [facility]  NEurology ordered autoimmune studies  After review, likely transvers myelitis  Started solumedrol. Monitor for improvement  Rehab evaluating    Ataxia- (present on admission)  Assessment & Plan  Abnormal gait with lower extremity weakness and numbness  Started a week before admission  MRI lumbar and thoracic are normal  We will order MRI cervical and brain  LP after the MRI  Neurology was consulted         VTE prophylaxis: Being held. For LP  I have performed a physical exam and reviewed and updated ROS and Plan today (9/20/2023). In review of yesterday's note (9/19/2023), there are no changes except as documented above.

## 2023-09-21 NOTE — DISCHARGE PLANNING
Alexandrea is lacking D/C health insurance.  [facility] will no longer follow.  Please reach out to myself if a PMR consult referral is needed for medical management or with any interval changes/questions.

## 2023-09-22 LAB
ALB CSF/SERPL: 8.3 RATIO (ref 0–9)
ALBUMIN CSF-MCNC: 34 MG/DL (ref 0–35)
ALBUMIN SERPL-MCNC: 4076 MG/DL (ref 3500–5200)
BACTERIA CSF CULT: NORMAL
CMV DNA SERPL NAA+PROBE-ACNC: NOT DETECTED IU/ML
CMV DNA SERPL NAA+PROBE-LOG IU: NOT DETECTED LOG IU/ML
CMV DNA SERPL QL NAA+PROBE: NOT DETECTED
EBV DNA SERPL NAA+PROBE-ACNC: NOT DETECTED [IU]/ML
EBV DNA SERPL NAA+PROBE-LOG#: NOT DETECTED {LOG_COPIES}/ML
EBV DNA SPEC QL NAA+PROBE: NOT DETECTED
GRAM STN SPEC: NORMAL
IGG CSF-MCNC: 6 MG/DL (ref 0–6)
IGG SERPL-MCNC: 1098 MG/DL (ref 768–1632)
IGG SYNTH RATE SER+CSF CALC-MRATE: 5.7 MG/D
IGG/ALB CLEAR SER+CSF-RTO: 0.66 RATIO (ref 0.28–0.66)
IGG/ALB CSF: 0.18 RATIO (ref 0.09–0.25)
OLIGOCLONAL BANDS CSF ELPH-IMP: NEGATIVE
OLIGOCLONAL BANDS CSF ELPH-IMP: NORMAL
OLIGOCLONAL BANDS CSF IEF: 0 BANDS (ref 0–1)
SIGNIFICANT IND 70042: NORMAL
SITE SITE: NORMAL
SOURCE SOURCE: NORMAL
WNV IGG CSF IA-ACNC: 0.07 IV
WNV IGM CSF IA-ACNC: 0.01 IV

## 2023-09-22 PROCEDURE — 99232 SBSQ HOSP IP/OBS MODERATE 35: CPT | Performed by: INTERNAL MEDICINE

## 2023-09-22 PROCEDURE — 770006 HCHG ROOM/CARE - MED/SURG/GYN SEMI*

## 2023-09-22 PROCEDURE — 700111 HCHG RX REV CODE 636 W/ 250 OVERRIDE (IP): Mod: JZ | Performed by: INTERNAL MEDICINE

## 2023-09-22 PROCEDURE — 99231 SBSQ HOSP IP/OBS SF/LOW 25: CPT | Performed by: PSYCHIATRY & NEUROLOGY

## 2023-09-22 PROCEDURE — 700105 HCHG RX REV CODE 258: Performed by: INTERNAL MEDICINE

## 2023-09-22 RX ADMIN — SODIUM CHLORIDE 1000 MG: 9 INJECTION, SOLUTION INTRAVENOUS at 05:08

## 2023-09-22 ASSESSMENT — ENCOUNTER SYMPTOMS
WEAKNESS: 1
SPEECH CHANGE: 1

## 2023-09-22 ASSESSMENT — PAIN DESCRIPTION - PAIN TYPE
TYPE: ACUTE PAIN
TYPE: ACUTE PAIN

## 2023-09-22 NOTE — PROGRESS NOTES
Hospital Medicine Daily Progress Note    Date of Service  9/21/2023    Chief Complaint  Alexandrea Isabel is a 38 y.o. male admitted 9/18/2023 with Weakness (+fever, headache, reproducible abdominal pain, neck pain, numbness tingling BLE, weakness since Wednesday. Reports history of myelitis, 7 years ago. He feels like these symptoms are the same. )        Hospital Course  No notes on file  Working up leg paresthesia and weakness.  Interval Problem Update  9/19.  I reviewed the chart along with vitals, labs, imaging, test (both pending and resulted) and recommendations from specialists and interdisciplinary team.  I spent time with patient, arranging to get an LP reviewing the MRI and updating NEurology, ordeing LP studies  Patient is with family who translated. He still has leg weakness/paresthesia.   MRI resulted with mild degenrative disk disease.  I reviewed Neuro recs. Ordered LP.  Patient agreeable. Held DVT prophylaxis.  9/20. Reveiwed LP results and discussed with Neurology  Transverse myelitis  I consulted ID on neurology's behalf. Dr. Beltran ordered further CSF viral studies.  Patient improving and walking with assistance. Family present.  I started steroids.  9/21. Improving slowly. He is in a good mood.   Tolerating steroids.  Reviewed labs.    I have discussed this patient's plan of care and discharge plan at IDT rounds today with Case Management, Nursing, Nursing leadership, and other members of the IDT team.    Consultants/Specialty  neurology    Code Status  Full Code    Disposition  Medically Cleared  I have placed the appropriate orders for post-discharge needs.    Review of Systems  Review of Systems   Neurological:  Positive for speech change and weakness.        Physical Exam  Temp:  [35.9 °C (96.7 °F)-36.7 °C (98.1 °F)] 36.4 °C (97.6 °F)  Pulse:  [64-83] 74  Resp:  [16-18] 17  BP: (106-122)/(63-76) 107/63  SpO2:  [92 %-94 %] 93 %    Physical Exam  Neurological:      Motor: Weakness (improving)  "present.         Fluids    Intake/Output Summary (Last 24 hours) at 9/21/2023 2312  Last data filed at 9/21/2023 1901  Gross per 24 hour   Intake 700 ml   Output 0 ml   Net 700 ml         Laboratory  Recent Labs     09/19/23  0403 09/21/23  0345   WBC 7.7 9.3   RBC 5.30 5.78   HEMOGLOBIN 15.3 16.9   HEMATOCRIT 44.6 47.9   MCV 84.2 82.9   MCH 28.9 29.2   MCHC 34.3 35.3   RDW 35.3* 34.7*   PLATELETCT 315 417   MPV 8.2* 8.4*       Recent Labs     09/19/23  0403 09/21/23  0345   SODIUM 135 136   POTASSIUM 4.4 4.3   CHLORIDE 100 100   CO2 25 23   GLUCOSE 114* 143*   BUN 18 18   CREATININE 0.96 0.89   CALCIUM 9.0 9.5       Recent Labs     09/19/23  0403   INR 0.96                 Imaging  MR-BRAIN-WITH & W/O   Final Result      No acute intracranial abnormality.      MR-CERVICAL SPINE-WITH & W/O   Final Result      Degenerative changes resulting in moderate bilateral foraminal stenosis at C3-4. No significant canal or foraminal stenosis elsewhere.         MR-THORACIC SPINE-WITH & W/O   Final Result         MRI of the thoracic spine without and with contrast within normal limits.      MR-LUMBAR SPINE-WITH & W/O   Final Result         Mild lumbar spine degenerative changes as described above without significant canal stenosis or foraminal narrowing.      No abnormal enhancement is identified in the lumbar spine.      Annular fissure noted at the dorsal aspect of L3-4. This could represent an independent source of back pain.      CT-ABDOMEN-PELVIS WITH   Final Result      No acute abnormality of the abdomen or pelvis.             Assessment/Plan  * Transverse myelitis (HCC)  Assessment & Plan  Patient had same problem years ago and treated with steroid possible myelitis  MRI did not show any acute finding and will order MRI for cervical and brain  Patient might need LP after MRI  On exam patient has peripheral nerve disease, possible Guillain-Barré  Neurology on board\"    Reviewed MRI brain. No acute pathology  Reviewed MRI " spine. Mild DJD  Spoke with Neurology  LP performed buy Dr. Estrada  [facility]  NEurology ordered autoimmune studies  After review, likely transvers myelitis  Day 2 steroids. Improving.  Rehab evaluating    Ataxia- (present on admission)  Assessment & Plan  Abnormal gait with lower extremity weakness and numbness  Started a week before admission  MRI lumbar and thoracic are normal  We will order MRI cervical and brain  LP after the MRI  Neurology was consulted         VTE prophylaxis: Being held. For LP  I have performed a physical exam and reviewed and updated ROS and Plan today (9/21/2023). In review of yesterday's note (9/20/2023), there are no changes except as documented above.

## 2023-09-22 NOTE — CARE PLAN
The patient is Stable - Low risk of patient condition declining or worsening    Shift Goals  Clinical Goals: improved neuro status  Patient Goals: comfort  Family Goals: alcira    Progress made toward(s) clinical / shift goals:    Problem: Knowledge Deficit - Standard  Goal: Patient and family/care givers will demonstrate understanding of plan of care, disease process/condition, diagnostic tests and medications  Outcome: Progressing     Problem: Pain - Standard  Goal: Alleviation of pain or a reduction in pain to the patient’s comfort goal  Outcome: Progressing       Patient is not progressing towards the following goals:

## 2023-09-22 NOTE — PROGRESS NOTES
Hospital Medicine Daily Progress Note    Date of Service  9/22/2023    Chief Complaint  Alexandrea Isabel is a 38 y.o. male admitted 9/18/2023 with Weakness (+fever, headache, reproducible abdominal pain, neck pain, numbness tingling BLE, weakness since Wednesday. Reports history of myelitis, 7 years ago. He feels like these symptoms are the same. )        Hospital Course  No notes on file  Working up leg paresthesia and weakness.  Interval Problem Update  9/19.  I reviewed the chart along with vitals, labs, imaging, test (both pending and resulted) and recommendations from specialists and interdisciplinary team.  I spent time with patient, arranging to get an LP reviewing the MRI and updating NEurology, ordeing LP studies  Patient is with family who translated. He still has leg weakness/paresthesia.   MRI resulted with mild degenrative disk disease.  I reviewed Neuro recs. Ordered LP.  Patient agreeable. Held DVT prophylaxis.  9/20. Reveiwed LP results and discussed with Neurology  Transverse myelitis  I consulted ID on neurology's behalf. Dr. Beltran ordered further CSF viral studies.  Patient improving and walking with assistance. Family present.  I started steroids.  9/21. Improving slowly. He is in a good mood.   Tolerating steroids.  Reviewed labs.  9/22. Doing well. Still slightly ataxic.     I have discussed this patient's plan of care and discharge plan at IDT rounds today with Case Management, Nursing, Nursing leadership, and other members of the IDT team.    Consultants/Specialty  neurology    Code Status  Full Code    Disposition  The patient is not medically cleared for discharge to home or a post-acute facility.      I have placed the appropriate orders for post-discharge needs.    Review of Systems  Review of Systems   Neurological:  Positive for speech change and weakness.        Physical Exam  Temp:  [36.1 °C (96.9 °F)-36.4 °C (97.6 °F)] 36.3 °C (97.3 °F)  Pulse:  [52-79] 52  Resp:  [16-17] 16  BP:  "(106-116)/(63-68) 116/68  SpO2:  [92 %-96 %] 94 %    Physical Exam  Neurological:      Motor: Weakness (improving) present.         Fluids    Intake/Output Summary (Last 24 hours) at 9/22/2023 1617  Last data filed at 9/22/2023 0416  Gross per 24 hour   Intake 500 ml   Output 0 ml   Net 500 ml         Laboratory  Recent Labs     09/21/23  0345   WBC 9.3   RBC 5.78   HEMOGLOBIN 16.9   HEMATOCRIT 47.9   MCV 82.9   MCH 29.2   MCHC 35.3   RDW 34.7*   PLATELETCT 417   MPV 8.4*       Recent Labs     09/21/23  0345   SODIUM 136   POTASSIUM 4.3   CHLORIDE 100   CO2 23   GLUCOSE 143*   BUN 18   CREATININE 0.89   CALCIUM 9.5                       Imaging  MR-BRAIN-WITH & W/O   Final Result      No acute intracranial abnormality.      MR-CERVICAL SPINE-WITH & W/O   Final Result      Degenerative changes resulting in moderate bilateral foraminal stenosis at C3-4. No significant canal or foraminal stenosis elsewhere.         MR-THORACIC SPINE-WITH & W/O   Final Result         MRI of the thoracic spine without and with contrast within normal limits.      MR-LUMBAR SPINE-WITH & W/O   Final Result         Mild lumbar spine degenerative changes as described above without significant canal stenosis or foraminal narrowing.      No abnormal enhancement is identified in the lumbar spine.      Annular fissure noted at the dorsal aspect of L3-4. This could represent an independent source of back pain.      CT-ABDOMEN-PELVIS WITH   Final Result      No acute abnormality of the abdomen or pelvis.             Assessment/Plan  * Transverse myelitis (HCC)  Assessment & Plan  Patient had same problem years ago and treated with steroid possible myelitis  MRI did not show any acute finding and will order MRI for cervical and brain  Patient might need LP after MRI  On exam patient has peripheral nerve disease, possible Guillain-Barré  Neurology on board\"    Reviewed MRI brain. No acute pathology  Reviewed MRI spine. Mild DJD  Spoke with " Neurology  LP performed buy Dr. Estrada  [facility]  NEurology ordered autoimmune studies  After review, likely transvers myelitis  Day 3steroids. Improving.  Rehab evaluating    Ataxia- (present on admission)  Assessment & Plan  Abnormal gait with lower extremity weakness and numbness  Started a week before admission  MRI lumbar and thoracic are normal  We will order MRI cervical and brain  LP after the MRI  Neurology was consulted         VTE prophylaxis: Being held. For LP  I have performed a physical exam and reviewed and updated ROS and Plan today (9/22/2023). In review of yesterday's note (9/21/2023), there are no changes except as documented above.

## 2023-09-22 NOTE — DISCHARGE PLANNING
"Case discussed in IDT rounds earlier today:  Currently on day 3/5 of IV Solu-Medrol.   Anticipating DC Home/Self Care in 2-3 days.  PT/OT recommending OP therapies.  Patient remains listed as \"Self-Pay\".  "

## 2023-09-22 NOTE — PROGRESS NOTES
Referring Physician: Luis Lobo M.D.    S: No acute events.  Has completed 3 doses of IV Solu-Medrol.  Symptoms are significantly improving characterized by less numbness and tingling from just above the waistline down to about the knees.  Distal sensory symptoms are unchanged.  Patient denies any new neurologic symptoms.  Walking is improving as well.    O:  Afebrile  Patient appears well.    Awake, alert and interactive.  Attention is intact.  Strabismus (OD)  Face appears symmetric.  Hearing is intact to conversation.  Moves all extremities symmetrically.  No obvious dysmetria.  Gait is improving characterized by less wide base and more stability with walking.  Patient is less cautious stride length has improved as well as well as speed of gait.  Stress gait and tandem gait were not assessed.      No interval neuroimaging or neurodiagnostic studies.    Copper 128  ACE 41  CSF CMV not detected  CSF culture no growth at 72 hours  No oligoclonal bands in the CSF  B12 854  CRP <0.30  Syphillis NR  Negative CSF meningitis/encephalitis panel  HIV NR  CMV not detected  SARS-CoV-2 not detected    Unrevealing MRI of the neuroaxis were unrevealing    Impression & Recommendations: Presumed MRI negative myelitis.  CSF with lymphocytic pleocytosis with elevated protein. Etiology remains elusive.  Significantly improving following 3 days of IV Solu-Medrol dosing.  Plan is to continue Solu-Medrol for 5 days.  Continue PT.  Follow-up pending labs.  Neurology will follow along.    I provided a total of 23 minutes of acute neurologic care for this patient encounter reviewing medical records, diagnostic studies, direct face-to-face time with the patient, documentation and coordinating plan of care.      Júnior Gonzalez MD  Neurohospitalist, Acute Care Services          Please note that this dictation was created using voice recognition software.  I have made every reasonable attempt to correct obvious errors, but I expect  that there are errors of grammar and possibly content that I did not discover before finalizing the note.

## 2023-09-23 LAB
AQP4 H2O CHANNEL IGG SERPL QL IF: NORMAL
MOG AB SER QL CBA IFA: NORMAL

## 2023-09-23 PROCEDURE — 770006 HCHG ROOM/CARE - MED/SURG/GYN SEMI*

## 2023-09-23 PROCEDURE — 99232 SBSQ HOSP IP/OBS MODERATE 35: CPT | Performed by: PSYCHIATRY & NEUROLOGY

## 2023-09-23 PROCEDURE — 99232 SBSQ HOSP IP/OBS MODERATE 35: CPT | Performed by: INTERNAL MEDICINE

## 2023-09-23 PROCEDURE — 700111 HCHG RX REV CODE 636 W/ 250 OVERRIDE (IP): Mod: JZ | Performed by: INTERNAL MEDICINE

## 2023-09-23 PROCEDURE — 97112 NEUROMUSCULAR REEDUCATION: CPT

## 2023-09-23 PROCEDURE — 97110 THERAPEUTIC EXERCISES: CPT

## 2023-09-23 PROCEDURE — 700105 HCHG RX REV CODE 258: Performed by: INTERNAL MEDICINE

## 2023-09-23 RX ADMIN — SODIUM CHLORIDE 1000 MG: 9 INJECTION, SOLUTION INTRAVENOUS at 04:21

## 2023-09-23 ASSESSMENT — COGNITIVE AND FUNCTIONAL STATUS - GENERAL
SUGGESTED CMS G CODE MODIFIER MOBILITY: CI
MOBILITY SCORE: 23
CLIMB 3 TO 5 STEPS WITH RAILING: A LITTLE

## 2023-09-23 ASSESSMENT — GAIT ASSESSMENTS
ASSISTIVE DEVICE: FRONT WHEEL WALKER
GAIT LEVEL OF ASSIST: MODIFIED INDEPENDENT

## 2023-09-23 ASSESSMENT — PAIN DESCRIPTION - PAIN TYPE
TYPE: ACUTE PAIN
TYPE: ACUTE PAIN

## 2023-09-23 ASSESSMENT — FIBROSIS 4 INDEX: FIB4 SCORE: 0.37

## 2023-09-23 ASSESSMENT — ENCOUNTER SYMPTOMS
WEAKNESS: 1
SPEECH CHANGE: 1

## 2023-09-23 NOTE — PROGRESS NOTES
Referring Physician: Luis Lobo M.D.    S: No acute events.  Doing well.  Overall improving sensory symptoms and walking.  No new neurologic symptoms.  Seems to be tolerating Solu-Medrol and has completed fourth dose today.    O: Tmax 97.9  Patient appears well.  Mood is good.    Awake, alert and interactive.  Attention is intact.  Strabismus (OD)  Face appears symmetric.  Hearing is intact to conversation.  Moves all extremities symmetrically.  No obvious dysmetria.  Gait slightly wide-based.  Otherwise stable appearing.  Not ataxic.  Stressed gait or tandem was not assessed.         No interval neuroimaging or neurodiagnostic studies.     Copper 128  ACE 41  CSF CMV not detected  CSF culture no growth at 72 hours  No oligoclonal bands in the CSF  B12 854  CRP <0.30  Syphillis NR  Negative CSF meningitis/encephalitis panel  HIV NR  CMV not detected  EBV not detected  SARS-CoV-2 not detected  MOG and NMO not detected  WNV negative     MRI of the neuroaxis were unrevealing      Impression & Recommendations: Presumed MRI-negative myelitis.  CSF with lymphocytic pleocytosis with elevated protein.  Idiopathic at this juncture as the work-up for an etiology is unrevealing.  Continues to improve following 4 days of IV Solu-Medrol.  Will complete IV Solu-Medrol 5 days of dosing tomorrow.  Continue PT.  Neurology will follow along.    I provided a total of 21 minutes of acute neurologic care for this patient encounter reviewing medical records, diagnostic studies, direct face-to-face time with the patient, documentation plan, and coordinating plan of care.      Júnior Gonzalez MD  Neurohospitalist, Acute Care Services          Please note that this dictation was created using voice recognition software.  I have made every reasonable attempt to correct obvious errors, but I expect that there are errors of grammar and possibly content that I did not discover before finalizing the note.

## 2023-09-23 NOTE — CARE PLAN
The patient is Stable - Low risk of patient condition declining or worsening    Shift Goals  Clinical Goals: Improve neuro symptoms  Patient Goals: Rest  Family Goals: MELISSA    Progress made toward(s) clinical / shift goals:  Patient is A&Ox4. Educated patient on POC. Patient complains of 1/10 abdominal and back pain but denies need for pharmacological or non-pharmacological intervention. Patient is up with FWW.  services available for communication with patient. Bed is low and locked. Call light within reach. Hourly rounding continues.       Problem: Knowledge Deficit - Standard  Goal: Patient and family/care givers will demonstrate understanding of plan of care, disease process/condition, diagnostic tests and medications  Outcome: Progressing  Note: Patient verbalizes understanding of education.      Problem: Pain - Standard  Goal: Alleviation of pain or a reduction in pain to the patient’s comfort goal  Outcome: Progressing  Note: Patient complains of 1/10 pain in abdomen and back. Denies the need for pharmacological intervention.        Patient is not progressing towards the following goals:

## 2023-09-23 NOTE — PROGRESS NOTES
Hospital Medicine Daily Progress Note    Date of Service  9/23/2023    Chief Complaint  Alexandrea Isabel is a 38 y.o. male admitted 9/18/2023 with Weakness (+fever, headache, reproducible abdominal pain, neck pain, numbness tingling BLE, weakness since Wednesday. Reports history of myelitis, 7 years ago. He feels like these symptoms are the same. )        Hospital Course  No notes on file  Working up leg paresthesia and weakness.  Interval Problem Update  9/19.  I reviewed the chart along with vitals, labs, imaging, test (both pending and resulted) and recommendations from specialists and interdisciplinary team.  I spent time with patient, arranging to get an LP reviewing the MRI and updating NEurology, ordeing LP studies  Patient is with family who translated. He still has leg weakness/paresthesia.   MRI resulted with mild degenrative disk disease.  I reviewed Neuro recs. Ordered LP.  Patient agreeable. Held DVT prophylaxis.  9/20. Reveiwed LP results and discussed with Neurology  Transverse myelitis  I consulted ID on neurology's behalf. Dr. Beltran ordered further CSF viral studies.  Patient improving and walking with assistance. Family present.  I started steroids.  9/21. Improving slowly. He is in a good mood.   Tolerating steroids.  Reviewed labs.  9/22. Doing well. Still slightly ataxic.   9/23. Day 4 of IV steroids. Stable. Improved.    I have discussed this patient's plan of care and discharge plan at IDT rounds today with Case Management, Nursing, Nursing leadership, and other members of the IDT team.    Consultants/Specialty  neurology    Code Status  Full Code    Disposition  The patient is not medically cleared for discharge to home or a post-acute facility.      I have placed the appropriate orders for post-discharge needs.    Review of Systems  Review of Systems   Neurological:  Positive for speech change and weakness.        Physical Exam  Temp:  [36.2 °C (97.2 °F)-36.6 °C (97.9 °F)] 36.2 °C (97.2  "°F)  Pulse:  [63-70] 66  Resp:  [16] 16  BP: (114-118)/(56-69) 118/56  SpO2:  [93 %-95 %] 95 %    Physical Exam  Neurological:      Motor: Weakness (improving) present.         Fluids    Intake/Output Summary (Last 24 hours) at 9/23/2023 1646  Last data filed at 9/23/2023 0800  Gross per 24 hour   Intake 800 ml   Output 0 ml   Net 800 ml         Laboratory  Recent Labs     09/21/23  0345   WBC 9.3   RBC 5.78   HEMOGLOBIN 16.9   HEMATOCRIT 47.9   MCV 82.9   MCH 29.2   MCHC 35.3   RDW 34.7*   PLATELETCT 417   MPV 8.4*       Recent Labs     09/21/23  0345   SODIUM 136   POTASSIUM 4.3   CHLORIDE 100   CO2 23   GLUCOSE 143*   BUN 18   CREATININE 0.89   CALCIUM 9.5                       Imaging  MR-BRAIN-WITH & W/O   Final Result      No acute intracranial abnormality.      MR-CERVICAL SPINE-WITH & W/O   Final Result      Degenerative changes resulting in moderate bilateral foraminal stenosis at C3-4. No significant canal or foraminal stenosis elsewhere.         MR-THORACIC SPINE-WITH & W/O   Final Result         MRI of the thoracic spine without and with contrast within normal limits.      MR-LUMBAR SPINE-WITH & W/O   Final Result         Mild lumbar spine degenerative changes as described above without significant canal stenosis or foraminal narrowing.      No abnormal enhancement is identified in the lumbar spine.      Annular fissure noted at the dorsal aspect of L3-4. This could represent an independent source of back pain.      CT-ABDOMEN-PELVIS WITH   Final Result      No acute abnormality of the abdomen or pelvis.             Assessment/Plan  * Transverse myelitis (HCC)  Assessment & Plan  Patient had same problem years ago and treated with steroid possible myelitis  MRI did not show any acute finding and will order MRI for cervical and brain  Patient might need LP after MRI  On exam patient has peripheral nerve disease, possible Guillain-Barré  Neurology on board\"    Reviewed MRI brain. No acute " pathology  Reviewed MRI spine. Mild DJD  Spoke with Neurology  LP performed buy Dr. Estrada  Ordered Gram and [facility]  NEurology ordered autoimmune studies  After review, likely transvers myelitis  Day 4 steroids. Improving.  Rehab evaluating    Ataxia- (present on admission)  Assessment & Plan  Abnormal gait with lower extremity weakness and numbness  Started a week before admission  MRI lumbar and thoracic are normal  We will order MRI cervical and brain  LP after the MRI  Neurology was consulted         VTE prophylaxis: Being held. For LP  I have performed a physical exam and reviewed and updated ROS and Plan today (9/23/2023). In review of yesterday's note (9/22/2023), there are no changes except as documented above.

## 2023-09-23 NOTE — CARE PLAN
The patient is Stable - Low risk of patient condition declining or worsening    Shift Goals  Clinical Goals: Monitor neuro s/s, safety  Patient Goals: comfort  Family Goals: NA    Progress made toward(s) clinical / shift goals:      Problem: Knowledge Deficit - Standard  Goal: Patient and family/care givers will demonstrate understanding of plan of care, disease process/condition, diagnostic tests and medications  Description: Target End Date:  1-3 days or as soon as patient condition allows    Document in Patient Education    1.  Patient and family/caregiver oriented to unit, equipment, visitation policy and means for communicating concern  2.  Complete/review Learning Assessment  3.  Assess knowledge level of disease process/condition, treatment plan, diagnostic tests and medications  4.  Explain disease process/condition, treatment plan, diagnostic tests and medications  Outcome: Progressing     Problem: Pain - Standard  Goal: Alleviation of pain or a reduction in pain to the patient’s comfort goal  Description: Target End Date:  Prior to discharge or change in level of care    Document on Vitals flowsheet    1.  Document pain using the appropriate pain scale per order or unit policy  2.  Educate and implement non-pharmacologic comfort measures (i.e. relaxation, distraction, massage, cold/heat therapy, etc.)  3.  Pain management medications as ordered  4.  Reassess pain after pain med administration per policy  5.  If opiods administered assess patient's response to pain medication is appropriate per POSS sedation scale  6.  Follow pain management plan developed in collaboration with patient and interdisciplinary team (including palliative care or pain specialists if applicable)  Outcome: Progressing       Patient is not progressing towards the following goals: NA

## 2023-09-24 LAB
HHV6 DNA # SPEC NAA+PROBE: <1000 CPY/ML
HHV6 DNA SPEC NAA+PROBE-LOG#: <3 LOG
HHV6 DNA SPEC NAA+PROBE: NORMAL
HHV6 IGG+IGM SER-IMP: NOT DETECTED
SPECIMEN SOURCE: NORMAL
VDRL CSF QL: NON REACTIVE

## 2023-09-24 PROCEDURE — 770006 HCHG ROOM/CARE - MED/SURG/GYN SEMI*

## 2023-09-24 PROCEDURE — 700105 HCHG RX REV CODE 258: Performed by: INTERNAL MEDICINE

## 2023-09-24 PROCEDURE — 99232 SBSQ HOSP IP/OBS MODERATE 35: CPT | Performed by: INTERNAL MEDICINE

## 2023-09-24 PROCEDURE — 700111 HCHG RX REV CODE 636 W/ 250 OVERRIDE (IP): Mod: JZ | Performed by: INTERNAL MEDICINE

## 2023-09-24 PROCEDURE — 99231 SBSQ HOSP IP/OBS SF/LOW 25: CPT | Performed by: PSYCHIATRY & NEUROLOGY

## 2023-09-24 RX ADMIN — SODIUM CHLORIDE 1000 MG: 9 INJECTION, SOLUTION INTRAVENOUS at 05:45

## 2023-09-24 ASSESSMENT — ENCOUNTER SYMPTOMS
WEAKNESS: 1
SPEECH CHANGE: 1

## 2023-09-24 ASSESSMENT — PAIN DESCRIPTION - PAIN TYPE
TYPE: ACUTE PAIN
TYPE: ACUTE PAIN

## 2023-09-24 NOTE — DISCHARGE PLANNING
"Case Management Discharge Planning    Admission Date: 9/18/2023  GMLOS: 5.8  ALOS: 6    6-Clicks ADL Score: 24  6-Clicks Mobility Score: 23      Anticipated Discharge Dispo: Discharge Disposition: Discharged to home/self care (01)    DME Needed: No    Action(s) Taken: RN CM spoke with the patient at bedside while receiving assistance from  Douglas [phone].  Patient is from [facility], [place].  Patient provided a form titled, \"Attending Physician's Assessment.\"  The form is in a mix of English and Kyrgyz.  RN CM asked if this form was for Worker's Comp, patient confirmed this.  RN CM asked if the patient was provided with any contact info for form submission, patient stated that he needs to submit the form himself physically.  RN CM reached out to [facility] for assistance.      Escalations Completed: PFA    Medically Clear: No    Next Steps: Care coordination to follow up with PFA regarding potential Work Comp insurance    Barriers to Discharge: Medical clearance    Is the patient up for discharge tomorrow: Potentially        "

## 2023-09-24 NOTE — CARE PLAN
The patient is Stable - Low risk of patient condition declining or worsening    Shift Goals  Clinical Goals: Stable neuro status  Patient Goals: Comfort  Family Goals: MELISSA    Progress made toward(s) clinical / shift goals:  Patient is AAOx4, he is able to communicate his needs. Patient will complete his last dose of IV steroids today. Bed low, locked and call light in reach.    Problem: Knowledge Deficit - Standard  Goal: Patient and family/care givers will demonstrate understanding of plan of care, disease process/condition, diagnostic tests and medications  Outcome: Progressing  Note: Education on safety with mobility provided, patient demonstrates understanding. Patient understands POC.  1.  Patient and family/caregiver oriented to unit, equipment, visitation policy and means for communicating concern  2.  Complete/review Learning Assessment  3.  Assess knowledge level of disease process/condition, treatment plan, diagnostic tests and medications  4.  Explain disease process/condition, treatment plan, diagnostic tests and medications     Problem: Pain - Standard  Goal: Alleviation of pain or a reduction in pain to the patient’s comfort goal  Outcome: Progressing  Note: Patient has no complaints of pain, will continue to reassess       Patient is not progressing towards the following goals: N/A

## 2023-09-24 NOTE — THERAPY
Physical Therapy   Daily Treatment     Patient Name: Alexandrea Isabel  Age:  38 y.o., Sex:  male  Medical Record #: [id number]  Today's Date: 9/23/2023     Precautions  Precautions: Fall Risk    Assessment    Pt with much improving dynamic balance, able to progress to arm swing and faster pace with focus of hip abd recruitment for stability; per pt and RN up self in room and has not been using his walker in there; picks walker up during hallway distances; provision of sensory integration exercises, LE exercises and aerobic exercise goals with fatigue parameters; pt is eager to progress and will do his own rehab as needed in hotel room; from PT perspective has strength to fly back to [facility] when medically appropriate; will follow at low frequency to ensure dc plan, recommend dc to outpatient services once back home if not improving in predicted timeline.     Please encourage frequent mobility     Plan    Treatment Plan Status: Modify Current Treatment Plan  Type of Treatment: Equipment, Gait Training, Manual Therapy, Neuro Re-Education / Balance, Self Care / Home Evaluation, Stair Training, Therapeutic Activities, Therapeutic Exercise  Treatment Frequency: 1 Time per Week  Treatment Duration: Until Therapy Goals Met    DC Equipment Recommendations: None (provided FWW and SPC)   Discharge Recommendations: Recommend outpatient physical therapy services to address higher level deficits      Abridged Subjective/Objective     09/23/23 1655   Cognition    Cognition / Consciousness WDL   Level of Consciousness Alert   Comments pleasant and cooperative; good understanding of recovery   Sensation Lower Body   Lower Extremity Sensation   X   Comments left thigh numbness continues; otherwise improving and mostly distal from knee down; intact temperature   Supine Lower Body Exercise   Supine Lower Body Exercises Yes   Comments provision and discussion of sensory intergration activities of different textures, pressures, temperatures for 2  minute goal at each major joint that is still numb   Standing Lower Body Exercises   Standing Lower Body Exercises Yes   Mini Squat 1 set of 10;Full    Comments 5 second count with focus on excentric lowering; standing on rolled up towel with hand hovering over walker and by bed, SLS with movements on none involved leg   Balance   Sitting Balance (Static) Good   Sitting Balance (Dynamic) Good   Standing Balance (Static) Good   Standing Balance (Dynamic) Fair +   Weight Shift Sitting Good   Weight Shift Standing Good   Skilled Intervention Postural Facilitation;Tactile Cuing;Sequencing;Verbal Cuing   Comments picking up walker; can walk without; loss of blance wiht turn occasionally but improves wiht speed; occasional increasedk nee flexion but keeps own balance   Bed Mobility    Supine to Sit Independent   Sit to Supine Independent   Gait Analysis   Gait Level Of Assist Modified Independent   Assistive Device Front Wheel Walker   Distance (Feet)   (did not track, multiple laps around gym for focus of hip abd strengthening and arm swing)   Vision Deficits Impacting Mobility wearing glasses   Skilled Intervention Sequencing;Verbal Cuing;Tactile Cuing   Comments with/without FWW; with slippesr on; latear states he likes his tennis shoes better;   Functional Mobility   Sit to Stand Independent   Comments pt has been up self in room [facility] FWW to bathroom per pt/RN   Short Term Goals    Short Term Goal # 1 in 6 V pt will perform varitety surface transfers with indep   Goal Outcome # 1 Goal met   Short Term Goal # 2 in 6 V pt will AMB without AD x 300 feet with supervision   Goal Outcome # 2 Goal not met   Short Term Goal # 3 in 6 V pt will climb up/down 10 stairs without rail and supervision  (does not need to perform)   Education Group   Role of Physical Therapist Patient Response Patient;Acceptance;Explanation;Reinforcement Needed   Additional Comments provision of sensory exercises, squats, balance on variable  surfaces; protein/water in diet; and timeline of healing

## 2023-09-24 NOTE — PROGRESS NOTES
"Referring Physician: Luis Lobo M.D.    S:  I spoke with RN and patient via a . No acute events.  Doing well.  Has completed treatment with 5 days of IV Solu-Medrol.  Significantly improving sensory symptoms.  Continues to have a little unsteadiness on his feet although that is improving as well.  No new neurologic symptoms.  Overall is \"80 to 90% better\" since admission.    O: Tmax 97.7    Patient appears well.  Mood is good.  Conversant.    Awake, alert and interactive.  Attention is intact.  Strabismus (OD).  Versions appear full.  Face appears symmetric.  Hearing is intact to conversation.  Moves all extremities symmetrically.  Antigravity in all extremities without drift.  No dysmetria.  Sensation intact to light touch.  Gait continues to be slightly wide-based.  Mildly ataxic.  Stress gait and tandem gait were not assessed.    Copper 128  ACE 41  CSF CMV not detected  CSF culture no growth at 72 hours  No oligoclonal bands in the CSF  B12 854  CRP <0.30  Syphillis NR  Negative CSF meningitis/encephalitis panel  HIV NR  CMV not detected  EBV not detected  SARS-CoV-2 not detected  MOG and NMO not detected  WNV negative  HHV not detected     MRI of the neuroaxis were unrevealing    No interval neuroimaging or neurodiagnostic studies.       Impression & Recommendations: Presumed idiopathic MRI-negative myelitis; improving. CSF with lymphocytic pleocytosis with elevated protein. Unrevealing work-up for etiology. Has completed 5 days of IV Solu-Medrol.  Anticipate discharge tomorrow morning.  Will need close follow-up with an outpatient neurologist.  Please place appropriate referral for neurology.  No additional inpatient neurology recommendations.  Neurology will sign off at this time.  Please reach out with any questions.    I provided a total of 24 minutes of acute neurologic care for this patient encounter reviewing medical records, diagnostic studies, direct face-to-face time with the patient, " documentation, and communicating plan of care.      Júnior Gonzalez MD  Neurohospitalist, Acute Care Services          Please note that this dictation was created using voice recognition software.  I have made every reasonable attempt to correct obvious errors, but I expect that there are errors of grammar and possibly content that I did not discover before finalizing the note.

## 2023-09-24 NOTE — PROGRESS NOTES
Hospital Medicine Daily Progress Note    Date of Service  9/24/2023    Chief Complaint  Alexandrea Isabel is a 38 y.o. male admitted 9/18/2023 with Weakness (+fever, headache, reproducible abdominal pain, neck pain, numbness tingling BLE, weakness since Wednesday. Reports history of myelitis, 7 years ago. He feels like these symptoms are the same. )        Hospital Course  No notes on file  Working up leg paresthesia and weakness.  Interval Problem Update  9/19.  I reviewed the chart along with vitals, labs, imaging, test (both pending and resulted) and recommendations from specialists and interdisciplinary team.  I spent time with patient, arranging to get an LP reviewing the MRI and updating NEurology, ordeing LP studies  Patient is with family who translated. He still has leg weakness/paresthesia.   MRI resulted with mild degenrative disk disease.  I reviewed Neuro recs. Ordered LP.  Patient agreeable. Held DVT prophylaxis.  9/20. Reveiwed LP results and discussed with Neurology  Transverse myelitis  I consulted ID on neurology's behalf. Dr. Beltran ordered further CSF viral studies.  Patient improving and walking with assistance. Family present.  I started steroids.  9/21. Improving slowly. He is in a good mood.   Tolerating steroids.  Reviewed labs.  9/22. Doing well. Still slightly ataxic.   9/23. Day 4 of IV steroids. Stable. Improved.  9/24. Day 5. Improved. Spoke with Neurology observe him off steroids. Plan to dc tomorrow. Updated patient.    I have discussed this patient's plan of care and discharge plan at IDT rounds today with Case Management, Nursing, Nursing leadership, and other members of the IDT team.    Consultants/Specialty  neurology    Code Status  Full Code    Disposition  The patient is not medically cleared for discharge to home or a post-acute facility.      I have placed the appropriate orders for post-discharge needs.    Review of Systems  Review of Systems   Neurological:  Positive for speech  "change and weakness.        Physical Exam  Temp:  [36.1 °C (97 °F)-36.5 °C (97.7 °F)] 36.1 °C (97 °F)  Pulse:  [60-67] 65  Resp:  [18] 18  BP: (110-122)/(66-76) 122/66  SpO2:  [94 %-95 %] 95 %    Physical Exam  Neurological:      Motor: Weakness (improving) present.         Fluids    Intake/Output Summary (Last 24 hours) at 9/24/2023 1640  Last data filed at 9/24/2023 0719  Gross per 24 hour   Intake 1006.92 ml   Output --   Net 1006.92 ml         Laboratory                              Imaging  MR-BRAIN-WITH & W/O   Final Result      No acute intracranial abnormality.      MR-CERVICAL SPINE-WITH & W/O   Final Result      Degenerative changes resulting in moderate bilateral foraminal stenosis at C3-4. No significant canal or foraminal stenosis elsewhere.         MR-THORACIC SPINE-WITH & W/O   Final Result         MRI of the thoracic spine without and with contrast within normal limits.      MR-LUMBAR SPINE-WITH & W/O   Final Result         Mild lumbar spine degenerative changes as described above without significant canal stenosis or foraminal narrowing.      No abnormal enhancement is identified in the lumbar spine.      Annular fissure noted at the dorsal aspect of L3-4. This could represent an independent source of back pain.      CT-ABDOMEN-PELVIS WITH   Final Result      No acute abnormality of the abdomen or pelvis.             Assessment/Plan  * Transverse myelitis (HCC)  Assessment & Plan  Patient had same problem years ago and treated with steroid possible myelitis  MRI did not show any acute finding and will order MRI for cervical and brain  Patient might need LP after MRI  On exam patient has peripheral nerve disease, possible Guillain-Barré  Neurology on board\"    Reviewed MRI brain. No acute pathology  Reviewed MRI spine. Mild DJD  Spoke with Neurology  LP performed buy Dr. Estrada  Ordered Gram and [facility]  NEurology ordered autoimmune studies  After review, likely transvers myelitis  Day 5 steroids. " Improving.  Rehab evaluating most likely outpt PT and OT    Ataxia- (present on admission)  Assessment & Plan  Abnormal gait with lower extremity weakness and numbness  Started a week before admission  MRI lumbar and thoracic are normal  We will order MRI cervical and brain  LP after the MRI  Neurology was consulted         VTE prophylaxis: Can resume  I have performed a physical exam and reviewed and updated ROS and Plan today (9/24/2023). In review of yesterday's note (9/23/2023), there are no changes except as documented above.

## 2023-09-25 ENCOUNTER — PATIENT OUTREACH (OUTPATIENT)
Dept: SCHEDULING | Facility: IMAGING CENTER | Age: 38
End: 2023-09-25

## 2023-09-25 VITALS
SYSTOLIC BLOOD PRESSURE: 111 MMHG | RESPIRATION RATE: 17 BRPM | HEART RATE: 63 BPM | HEIGHT: 65 IN | WEIGHT: 139.33 LBS | TEMPERATURE: 97.7 F | DIASTOLIC BLOOD PRESSURE: 67 MMHG | BODY MASS INDEX: 23.21 KG/M2 | OXYGEN SATURATION: 95 %

## 2023-09-25 LAB
WNV IGG SER QL IA: NORMAL
WNV IGM SER QL IA: NORMAL

## 2023-09-25 PROCEDURE — 99239 HOSP IP/OBS DSCHRG MGMT >30: CPT | Performed by: INTERNAL MEDICINE

## 2023-09-25 RX ORDER — ACETAMINOPHEN 325 MG/1
650 TABLET ORAL EVERY 6 HOURS PRN
Qty: 30 TABLET | Refills: 0 | Status: SHIPPED | OUTPATIENT
Start: 2023-09-25

## 2023-09-25 ASSESSMENT — PAIN DESCRIPTION - PAIN TYPE: TYPE: ACUTE PAIN

## 2023-09-25 NOTE — DISCHARGE PLANNING
Case discussed in IDT rounds earlier today:  Per Dr. Lobo, anticipating DC Home today with OP PT/OT pending Neurology clearance.  No CM DC needs identified.

## 2023-09-25 NOTE — DISCHARGE INSTRUCTIONS
Special Equipment    You are being discharged with the following special equipment:  Walker      Diet    Resume your normal diet as tolerated.  A diet low in cholesterol, fat, and sodium is recommended for good health.     Activity    Resume Your Normal Activity    You may resume your normal activity as tolerated.  Rest as needed.

## 2023-09-25 NOTE — DISCHARGE SUMMARY
Discharge Summary    CHIEF COMPLAINT ON ADMISSION  Chief Complaint   Patient presents with    Weakness     +fever, headache, reproducible abdominal pain, neck pain, numbness tingling BLE, weakness since Wednesday. Reports history of myelitis, 7 years ago. He feels like these symptoms are the same.        Reason for Admission  Sent by ; Fever; Weakness     Admission Date  9/18/2023    CODE STATUS  Full Code    HPI & HOSPITAL COURSE  This is a 38 y.o. male here with Weakness (+fever, headache, reproducible abdominal pain, neck pain, numbness tingling BLE, weakness since Wednesday. Reports history of myelitis, 7 years ago. He feels like these symptoms are the same. )  Please review Dr. Imer Galicia M.D. notes for further details of history of present illness, past medical/social/family histories, allergies and medications. Please review Dr. Gonzalez, Neurology; Dr. Beltran, ID consultation notes.  He presented with weakness, poor gait and one episode of fever. He otherwise has no known medical problems other than smoking.   At the ED, he is afebrile, normotensive.  No leukocytosis. Not hypoglycemic.  MRI brain normal  MR spine showed mild degenerative joint disease cervical and mild lumbar spine degenerative changes as described above without significant canal stenosis or foraminal narrowing. Annular fissure noted at the dorsal aspect of L3-4. This could represent an independent source of back pain.  LP was done. No evidence of infectious meningitis. Neurology thus diagnosed him with transverse myelitis. He was started on steroids. He completed course 5 days. He improved. PT/OT felt he can go home and recommended outpatient PT/OT. He was thus discharged on a walker.    At discharge date, Alexandrea Isabel afebrile and hemodynamically stable.  Alexandrea Isabel wanted to be discharged today.      Imaging  MR-BRAIN-WITH & W/O   Final Result      No acute intracranial abnormality.      MR-CERVICAL SPINE-WITH & W/O   Final Result       Degenerative changes resulting in moderate bilateral foraminal stenosis at C3-4. No significant canal or foraminal stenosis elsewhere.         MR-THORACIC SPINE-WITH & W/O   Final Result         MRI of the thoracic spine without and with contrast within normal limits.      MR-LUMBAR SPINE-WITH & W/O   Final Result         Mild lumbar spine degenerative changes as described above without significant canal stenosis or foraminal narrowing.      No abnormal enhancement is identified in the lumbar spine.      Annular fissure noted at the dorsal aspect of L3-4. This could represent an independent source of back pain.      CT-ABDOMEN-PELVIS WITH   Final Result      No acute abnormality of the abdomen or pelvis.                Therefore, he is discharged in good and stable condition to home with close outpatient follow-up.    The patient met 2-midnight criteria for an inpatient stay at the time of discharge.    Discharge Date  9/25/2023    FOLLOW UP ITEMS POST DISCHARGE      DISCHARGE DIAGNOSES  Principal Problem:    Transverse myelitis (HCC) (POA: Unknown)  Active Problems:    Ataxia (POA: Yes)        FOLLOW UP  No future appointments.  No follow-up provider specified.  Assign and follow up with primary provider or discharge clinic physician in 1 week  Follow up with Neurology in1 week for transverse myelitis  NURSING provide resources/pamphlets for  Transverse myelitis, outpatient PT and OT      MEDICATIONS ON DISCHARGE     Medication List        START taking these medications        Instructions   acetaminophen 325 MG Tabs  Commonly known as: Tylenol   Take 2 Tablets by mouth every 6 hours as needed for Fever, Moderate Pain or Mild Pain.  Dose: 650 mg              Allergies  No Known Allergies    DIET  Orders Placed This Encounter   Procedures    Diet Order Diet: Regular     Standing Status:   Standing     Number of Occurrences:   1     Order Specific Question:   Diet:     Answer:   Regular [1]       ACTIVITY  Avoid  heavy lifting or strenuous activity    CONSULTATIONS  Neurology  ID    PROCEDURES      LABORATORY  Lab Results   Component Value Date    SODIUM 136 09/21/2023    POTASSIUM 4.3 09/21/2023    CHLORIDE 100 09/21/2023    CO2 23 09/21/2023    GLUCOSE 143 (H) 09/21/2023    BUN 18 09/21/2023    CREATININE 0.89 09/21/2023        Lab Results   Component Value Date    WBC 9.3 09/21/2023    HEMOGLOBIN 16.9 09/21/2023    HEMATOCRIT 47.9 09/21/2023    PLATELETCT 417 09/21/2023        Total time of the discharge process exceeds 35 minutes.

## 2023-09-25 NOTE — FACE TO FACE
Face to Face Note  -  Durable Medical Equipment    Luis Lobo M.D. - NPI: [id number]  I certify that this patient is under my care and that they had a durable medical equipment(DME)face to face encounter by myself that meets the physician DME face-to-face encounter requirements with this patient on:    Date of encounter:   Patient:                    MRN:                       YOB: 2023  Alexandrea Isabel  [id number]  1985     The encounter with the patient was in whole, or in part, for the following medical condition, which is the primary reason for durable medical equipment:  Other - transverse myelitis    I certify that, based on my findings, the following durable medical equipment is medically necessary:    Walkers.    My Clinical findings support the need for the above equipment due to:  Abnormal Gait

## 2023-09-25 NOTE — CARE PLAN
The patient is Stable - Low risk of patient condition declining or worsening    Shift Goals  Clinical Goals: monitor neuro status  Patient Goals: rest  Family Goals: MELISSA    Progress made toward(s) clinical / shift goals:    Problem: Pain - Standard  Goal: Alleviation of pain or a reduction in pain to the patient’s comfort goal  Outcome: Progressing   Patient denies pain.   Problem: Knowledge Deficit - Standard  Goal: Patient and family/care givers will demonstrate understanding of plan of care, disease process/condition, diagnostic tests and medications  Outcome: Progressing    needed to ask questions and provide full assessment.     Patient is not progressing towards the following goals:

## 2023-09-25 NOTE — CARE PLAN
Problem: Knowledge Deficit - Standard  Goal: Patient and family/care givers will demonstrate understanding of plan of care, disease process/condition, diagnostic tests and medications  Description: Target End Date:  1-3 days or as soon as patient condition allows    Document in Patient Education    1.  Patient and family/caregiver oriented to unit, equipment, visitation policy and means for communicating concern  2.  Complete/review Learning Assessment  3.  Assess knowledge level of disease process/condition, treatment plan, diagnostic tests and medications  4.  Explain disease process/condition, treatment plan, diagnostic tests and medications  Outcome: Progressing   The patient is Stable - Low risk of patient condition declining or worsening    Shift Goals  Clinical Goals: stable neuro  Patient Goals: go home  Family Goals: MELISSA    Progress made toward(s) clinical / shift goals:      Patient is not progressing towards the following goals: